# Patient Record
Sex: FEMALE | HISPANIC OR LATINO | Employment: FULL TIME | URBAN - METROPOLITAN AREA
[De-identification: names, ages, dates, MRNs, and addresses within clinical notes are randomized per-mention and may not be internally consistent; named-entity substitution may affect disease eponyms.]

---

## 2017-06-19 ENCOUNTER — OFFICE VISIT (OUTPATIENT)
Dept: INTERNAL MEDICINE | Facility: CLINIC | Age: 47
End: 2017-06-19
Payer: COMMERCIAL

## 2017-06-19 VITALS
HEART RATE: 90 BPM | OXYGEN SATURATION: 98 % | WEIGHT: 182.75 LBS | SYSTOLIC BLOOD PRESSURE: 130 MMHG | DIASTOLIC BLOOD PRESSURE: 82 MMHG | BODY MASS INDEX: 32.38 KG/M2 | RESPIRATION RATE: 16 BRPM | HEIGHT: 63 IN

## 2017-06-19 DIAGNOSIS — K21.9 GASTROESOPHAGEAL REFLUX DISEASE, ESOPHAGITIS PRESENCE NOT SPECIFIED: ICD-10-CM

## 2017-06-19 DIAGNOSIS — N92.0 MENORRHAGIA WITH REGULAR CYCLE: Primary | ICD-10-CM

## 2017-06-19 DIAGNOSIS — R19.7 DIARRHEA OF PRESUMED INFECTIOUS ORIGIN: ICD-10-CM

## 2017-06-19 PROCEDURE — 99203 OFFICE O/P NEW LOW 30 MIN: CPT | Mod: S$GLB,,, | Performed by: NURSE PRACTITIONER

## 2017-06-19 PROCEDURE — 99999 PR PBB SHADOW E&M-EST. PATIENT-LVL V: CPT | Mod: PBBFAC,,, | Performed by: NURSE PRACTITIONER

## 2017-06-19 RX ORDER — NAPROXEN 500 MG/1
500 TABLET ORAL 2 TIMES DAILY WITH MEALS
Qty: 60 TABLET | Refills: 0 | Status: SHIPPED | OUTPATIENT
Start: 2017-06-19 | End: 2017-09-22

## 2017-06-19 RX ORDER — PROMETHAZINE HYDROCHLORIDE 25 MG/1
TABLET ORAL
Refills: 0 | COMMUNITY
Start: 2017-05-10 | End: 2017-07-10 | Stop reason: SDUPTHER

## 2017-06-19 RX ORDER — IBUPROFEN 800 MG/1
TABLET ORAL
Refills: 1 | COMMUNITY
Start: 2017-06-01 | End: 2017-06-19

## 2017-06-19 RX ORDER — CIPROFLOXACIN 500 MG/1
500 TABLET ORAL EVERY 12 HOURS
Qty: 14 TABLET | Refills: 0 | Status: SHIPPED | OUTPATIENT
Start: 2017-06-19 | End: 2017-07-10

## 2017-06-19 RX ORDER — OMEPRAZOLE 40 MG/1
40 CAPSULE, DELAYED RELEASE ORAL DAILY
Qty: 30 CAPSULE | Refills: 0 | Status: SHIPPED | OUTPATIENT
Start: 2017-06-19 | End: 2018-01-05 | Stop reason: CLARIF

## 2017-06-19 NOTE — PATIENT INSTRUCTIONS
"  [place] Diet  Your healthcare provider may recommend a bland diet if you have an upset stomach. It consists of foods that are mild and easy to digest. It is better to eat small frequent meals rather than 3 large meals a day.    Beverages  OK: Fruit juices, non-caffeinated teas and coffee, non-carbonated [place]  Avoid: Carbonated beverage, caffeinated tea and coffee, all alcoholic beverages  Bread  OK: Refined white, wheat or rye bread, erika or soda crackers, Brisa toast, plain rolls, bagels  Avoid: Whole-grain bread  Cereal  OK: Refined cereals: cooked or ready to eat  Avoid: Whole-grain cereals and granola, or those containing bran, seeds or nuts  Desserts  OK: Peanut butter and all others except those to "avoid"  Avoid: Chocolate, cocoa, coconut, popcorn, nuts, seeds, jam, marmalade  Fruits  OK: Canned, cooked, frozen or fresh fruits without seeds or tough skin  Avoid: Olives, skin and seeds of fruit  Meats  OK: All fresh or preserved meat, fish and fowl  Avoid: Any that are prepared with those spices to "avoid"  Cheese and eggs  OK: Eggs, cottage cheese, cream cheese, other cheeses  Avoid: All cheeses made with those spices to "avoid"  Potatoes and pasta  OK: Potato, rice, macaroni, noodles, spaghetti  Avoid: None  Soups  OK: All soups without heavy seasoning  Avoid: Soups made with those spices to "avoid"  Vegetables  OK: Canned, cooked, fresh or frozen mildly flavored vegetables without seeds, skins or coarse fiber  Avoid: Vegetables prepared with those spices to "avoid"; skin and seeds of vegetables and those with coarse fiber  Spices  OK: Salt, lemon and lime juice, vinegar, all extracts, juan, cinnamon, thyme, mace, allspice, paprika  Avoid: Chili powder, cloves, pepper, seed spices, garlic, gravy pickles, highly seasoned salad dressings  Date Last Reviewed: 11/20/2015  © 4801-2170 [employer]. [address] [place] [address]. All rights reserved. This information is not intended as " a substitute for professional medical care. Always follow your healthcare professional's instructions.

## 2017-06-23 ENCOUNTER — CLINICAL SUPPORT (OUTPATIENT)
Dept: OTHER | Facility: CLINIC | Age: 47
End: 2017-06-23
Payer: COMMERCIAL

## 2017-06-23 VITALS
HEIGHT: 61 IN | WEIGHT: 184 LBS | SYSTOLIC BLOOD PRESSURE: 128 MMHG | BODY MASS INDEX: 34.74 KG/M2 | DIASTOLIC BLOOD PRESSURE: 86 MMHG

## 2017-06-23 DIAGNOSIS — Z00.8 HEALTH EXAMINATION IN POPULATION SURVEYS: Primary | ICD-10-CM

## 2017-06-23 LAB
GLUCOSE SERPL-MCNC: 118 MG/DL (ref 60–140)
POC CHOLESTEROL, HDL: 47 MG/DL (ref 40–?)
POC CHOLESTEROL, LDL: 81 MG/DL (ref ?–160)
POC CHOLESTEROL, TOTAL: 148 MG/DL (ref ?–240)
POC GLUCOSE FASTING: NORMAL MG/DL (ref 60–110)
POC TOTAL CHOLESTEROL / HDL RATIO: 3.1 (ref ?–6)
POC TRIGLYCERIDES: 100 MG/DL (ref ?–160)

## 2017-06-23 PROCEDURE — 82947 ASSAY GLUCOSE BLOOD QUANT: CPT | Mod: QW,S$GLB,, | Performed by: INTERNAL MEDICINE

## 2017-06-23 PROCEDURE — 80061 LIPID PANEL: CPT | Mod: QW,S$GLB,, | Performed by: INTERNAL MEDICINE

## 2017-06-23 PROCEDURE — 99401 PREV MED CNSL INDIV APPRX 15: CPT | Mod: S$GLB,,, | Performed by: INTERNAL MEDICINE

## 2017-07-10 ENCOUNTER — LAB VISIT (OUTPATIENT)
Dept: LAB | Facility: HOSPITAL | Age: 47
End: 2017-07-10
Payer: COMMERCIAL

## 2017-07-10 ENCOUNTER — OFFICE VISIT (OUTPATIENT)
Dept: INTERNAL MEDICINE | Facility: CLINIC | Age: 47
End: 2017-07-10
Payer: COMMERCIAL

## 2017-07-10 VITALS
OXYGEN SATURATION: 97 % | WEIGHT: 182.75 LBS | BODY MASS INDEX: 32.38 KG/M2 | HEIGHT: 63 IN | DIASTOLIC BLOOD PRESSURE: 80 MMHG | SYSTOLIC BLOOD PRESSURE: 126 MMHG | HEART RATE: 97 BPM

## 2017-07-10 DIAGNOSIS — Z11.3 SCREENING FOR STDS (SEXUALLY TRANSMITTED DISEASES): ICD-10-CM

## 2017-07-10 DIAGNOSIS — Z11.3 SCREENING FOR STDS (SEXUALLY TRANSMITTED DISEASES): Primary | ICD-10-CM

## 2017-07-10 DIAGNOSIS — M54.9 MECHANICAL BACK PAIN: ICD-10-CM

## 2017-07-10 DIAGNOSIS — D25.9 UTERINE LEIOMYOMA, UNSPECIFIED LOCATION: ICD-10-CM

## 2017-07-10 DIAGNOSIS — F41.9 ANXIETY: ICD-10-CM

## 2017-07-10 DIAGNOSIS — Z00.00 HEALTHCARE MAINTENANCE: ICD-10-CM

## 2017-07-10 PROCEDURE — 99999 PR PBB SHADOW E&M-EST. PATIENT-LVL V: CPT | Mod: PBBFAC,,,

## 2017-07-10 PROCEDURE — 36415 COLL VENOUS BLD VENIPUNCTURE: CPT

## 2017-07-10 PROCEDURE — 86703 HIV-1/HIV-2 1 RESULT ANTBDY: CPT

## 2017-07-10 PROCEDURE — 87591 N.GONORRHOEAE DNA AMP PROB: CPT

## 2017-07-10 PROCEDURE — 86592 SYPHILIS TEST NON-TREP QUAL: CPT

## 2017-07-10 RX ORDER — PROMETHAZINE HYDROCHLORIDE 25 MG/1
TABLET ORAL
Qty: 20 TABLET | Refills: 0 | Status: SHIPPED | OUTPATIENT
Start: 2017-07-10 | End: 2018-07-17
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
## 2017-07-10 NOTE — PATIENT INSTRUCTIONS
Referrals were sent to gynecology, psychology, and the [facility] program. I also placed an order for a screening mammogram. Please come back to clinic if there is any worsening of the conditions discussed today.

## 2017-07-11 LAB
C TRACH DNA SPEC QL NAA+PROBE: NOT DETECTED
HIV 1+2 AB+HIV1 P24 AG SERPL QL IA: NEGATIVE
N GONORRHOEA DNA SPEC QL NAA+PROBE: NOT DETECTED
RPR SER QL: NORMAL
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
## 2017-08-25 ENCOUNTER — OFFICE VISIT (OUTPATIENT)
Dept: UROGYNECOLOGY | Facility: CLINIC | Age: 47
End: 2017-08-25
Attending: OBSTETRICS & GYNECOLOGY
Payer: COMMERCIAL

## 2017-08-25 ENCOUNTER — TELEPHONE (OUTPATIENT)
Dept: INTERNAL MEDICINE | Facility: CLINIC | Age: 47
End: 2017-08-25

## 2017-08-25 ENCOUNTER — HOSPITAL ENCOUNTER (OUTPATIENT)
Dept: RADIOLOGY | Facility: HOSPITAL | Age: 47
Discharge: HOME | End: 2017-08-25
Payer: COMMERCIAL

## 2017-08-25 VITALS
WEIGHT: 185.63 LBS | BODY MASS INDEX: 32.89 KG/M2 | HEIGHT: 63 IN | DIASTOLIC BLOOD PRESSURE: 90 MMHG | SYSTOLIC BLOOD PRESSURE: 122 MMHG

## 2017-08-25 DIAGNOSIS — Z01.419 WELL WOMAN EXAM: Primary | ICD-10-CM

## 2017-08-25 DIAGNOSIS — N85.2 ENLARGED UTERUS: ICD-10-CM

## 2017-08-25 DIAGNOSIS — R15.0 INCOMPLETE DEFECATION: ICD-10-CM

## 2017-08-25 DIAGNOSIS — N39.3 STRESS INCONTINENCE, FEMALE: ICD-10-CM

## 2017-08-25 DIAGNOSIS — Z12.4 ENCOUNTER FOR SCREENING FOR CERVICAL CANCER: ICD-10-CM

## 2017-08-25 DIAGNOSIS — K59.00 CONSTIPATION, UNSPECIFIED CONSTIPATION TYPE: ICD-10-CM

## 2017-08-25 DIAGNOSIS — Z12.31 VISIT FOR SCREENING MAMMOGRAM: ICD-10-CM

## 2017-08-25 DIAGNOSIS — Z00.00 HEALTHCARE MAINTENANCE: ICD-10-CM

## 2017-08-25 DIAGNOSIS — N39.3 STRESS INCONTINENCE IN FEMALE: ICD-10-CM

## 2017-08-25 PROCEDURE — 77067 SCR MAMMO BI INCL CAD: CPT | Mod: 26,,, | Performed by: INTERNAL MEDICINE

## 2017-08-25 PROCEDURE — 88175 CYTOPATH C/V AUTO FLUID REDO: CPT

## 2017-08-25 PROCEDURE — 77063 BREAST TOMOSYNTHESIS BI: CPT | Mod: 26,,, | Performed by: INTERNAL MEDICINE

## 2017-08-25 PROCEDURE — 77067 SCR MAMMO BI INCL CAD: CPT | Mod: TC

## 2017-08-25 PROCEDURE — 87624 HPV HI-RISK TYP POOLED RSLT: CPT

## 2017-08-25 PROCEDURE — 99386 PREV VISIT NEW AGE 40-64: CPT | Mod: S$GLB,,, | Performed by: NURSE PRACTITIONER

## 2017-08-25 PROCEDURE — 99999 PR PBB SHADOW E&M-EST. PATIENT-LVL III: CPT | Mod: PBBFAC,,, | Performed by: NURSE PRACTITIONER

## 2017-08-25 NOTE — PROGRESS NOTES
"    SUBJECTIVE:   47 y.o. female for annual exam.    Past Medical History:   Diagnosis Date    GERD (gastroesophageal reflux disease)     Preeclampsia     Uterine fibroid        Past Surgical History:   Procedure Laterality Date    SINUS SURGERY      TUBAL LIGATION      "1 tube tied, 1 tube burned"       Current Outpatient Prescriptions   Medication Sig Dispense Refill    naproxen (EC NAPROSYN) 500 MG EC tablet Take 1 tablet (500 mg total) by mouth 2 (two) times daily with meals. 60 tablet 0    omeprazole (PRILOSEC) 40 MG capsule Take 1 capsule (40 mg total) by mouth once daily. 30 capsule 0    promethazine (PHENERGAN) 25 MG tablet TAKE 1 TABLET(S) EVERY 6 HOURS BY ORAL ROUTE AS NEEDED. 20 tablet 0     No current facility-administered medications for this visit.      Allergies: Review of patient's allergies indicates no known allergies.   Patient's last menstrual period was 2017 (exact date).         +kidney cancer-- sister    Denies family history of breast, colon, uterine, cervical, ovarian, and bladder cancer.    Well Woman:  Pap:2016--normal per patient report  Mammo:2017--pending  Colonoscopy:n/a  Dexa: years ago---reports normal    ROS:  Feeling well.   No dyspnea or chest pain on exertion.    No abdominal pain, change in bowel habits, black or bloody stools.      UI:  (+) GAMA   (--) UUI   (--) padsDaytime frequency: Q 3-4 hours.  Nocturia: No:  (--) dysuria,  (--) hematuria,  (--) frequent UTIs.  (+) complete bladder emptying.        POP:  Absent.  Symptoms:(--)  .  (--) vaginal bleeding. (--) vaginal discharge. (+) sexually active.  (--) dyspareunia.   (--)  Vaginal dryness.  (--) vaginal estrogen use.      BM:  (+) constipation/straining.  (--) chronic diarrhea. (--) hematochezia.  (--) fecal incontinence.  (--) fecal smearing/urgency.  (+) incomplete evacuation.  Complains of menorrhagia/ metrorrhagia    GYN ROS: normal menses, no abnormal bleeding, pelvic " "pain or discharge, no breast pain or new or enlarging lumps on self exam, she complains of menorrhagia/ metrorrhagia.   No neurological complaints.    OBJECTIVE:   The patient appears well, alert, oriented x 3, in no distress.  BP (!) 122/90 (BP Method: Large (Manual))   Ht 5' 3" (1.6 m)   Wt 84.2 kg (185 lb 10 oz)   LMP 08/17/2017 (Exact Date)   BMI 32.88 kg/m²   ENT normal.  Neck supple. No adenopathy or thyromegaly. VANESSA.   Lungs are clear, good air entry, no wheezes, rhonchi or rales.   S1 and S2 normal, no murmurs, regular rate and rhythm.   Abdomen soft without tenderness, guarding, mass or organomegaly.   Extremities show no edema, normal peripheral pulses.   Neurological is normal, no focal findings.    BREAST EXAM: breasts appear normal, no suspicious masses, no skin or nipple changes or axillary nodes    PELVIC EXAM:   VULVA: normal appearing vulva with no masses, tenderness or lesions,   VAGINA: normal appearing vagina with normal color and discharge, no lesions,   CERVIX: normal appearing cervix without discharge or lesions, friable after pap smear, UTERUS: enlarged to 16 week's size, ADNEXA: no masses, (difficult to assess due to uterus)  RECTAL: deferred    POP-Q:  Aa -1; Ba -1; C -5; Ap -1; Bp -1; D -8.  Genital hiatus 3, perineal body 2 total vaginal length 11.   Dr. Bridges present during exam    [facility] 2/5    (--)GAMA    ASSESSMENT:   1. Well woman exam     2. Enlarged uterus  US Pelvis Complete Non OB   3. Encounter for screening for cervical cancer   Liquid-based pap smear, screening    HPV DNA (High Risk)   4. Stress incontinence, female  Simple Urodynamics w/ Cysto   5. Constipation, unspecified constipation type     6. Incomplete defecation         PLAN:   1. Well woman  --pap today  --takes 2-3 weeks for results    2.enlarged uterus  --pelvic ultrasound  --discussed with Dr. Yuliana TAYLOR vs. Vaginal hysterctomy/ uterosacral suspension  --will need urodyanamics prior to scheduling surgery "     Obtain records from Dr. Medina for pap smear and pelvic ultrasound and DIS ([place]) pelvic ultrasound [phone]    3. Stress incontinence  --will schedule suds  --possible pelvic floor PT    4. Constipation/incomplete defecation:  Controlling constipation may help bladder urgency/leakage and fiber may better control cholesterol and blood glucose.  Start daily fiber.  Take 1 tsp of fiber powder (psyllium or other sugar-free powder).  Mix in 8 oz of water.  Take x 3-5 days.  Then, increase fiber by 1 tsp every 3-5 days until stool is easy to pass.  Stop and continue at that dose.   Do not exceed 6 tsps/day.  May also use over the counter stool softener 1-2 x/day.  AVOID laxatives.    Or     Take 1 fiber pill/day x 3 days.  Then 2 pills/day x 3 days.  Then 3 pills/day x 3 days...increasing in this fashion to max 6 pills a day.  STOP when you find dose that makes stool easy to pass (this may be 1 pill a day or may be 4 pills/day).  Continue at this dose FOREVER.  Additionally, take 1-2 stool softener pills (EX: colace) OTC daily.  AVOID laxatives.     5. RTC for suds      30 minutes were spent in face to face time with this patient  75 % of this time was spent in counseling and/or coordination of care  Vianney ROBLES Francisca  [facility]  Division of Female Pelvic Medicine and Reconstructive Surgery  Department of Obstetrics & Gynecology

## 2017-08-25 NOTE — TELEPHONE ENCOUNTER
----- Message from Yuliet Acevedo sent at 8/25/2017 12:49 PM CDT -----  Contact: Self/[phone]  Patient would like to get test results.  Name of test :  STD testing(Labs)  Date of test:  7/10/2017  Ordering provider: Dr.Logan Chi  Where was the test performed:  [facility]  Comments:  Please call and advise        Thanks!!!

## 2017-08-25 NOTE — PATIENT INSTRUCTIONS
1. Well woman  --pap today  --takes 2-3 weeks for results    2.enlarged uterus  --pelvic ultrasound  --discussed with Dr. Yuliana TAYLOR vs. Vaginal hysterctomy/ uterosacral suspension  --will need urodyanamics prior to scheduling surgery     Obtain records from Dr. Medina for pap smear and pelvic ultrasound and DIS ([place]) pelvic ultrasound [phone]    3. Stress incontinence  --will schedule suds  --possible pelvic floor PT    4. Constipation:  Controlling constipation may help bladder urgency/leakage and fiber may better control cholesterol and blood glucose.  Start daily fiber.  Take 1 tsp of fiber powder (psyllium or other sugar-free powder).  Mix in 8 oz of water.  Take x 3-5 days.  Then, increase fiber by 1 tsp every 3-5 days until stool is easy to pass.  Stop and continue at that dose.   Do not exceed 6 tsps/day.  May also use over the counter stool softener 1-2 x/day.  AVOID laxatives.    Or     Take 1 fiber pill/day x 3 days.  Then 2 pills/day x 3 days.  Then 3 pills/day x 3 days...increasing in this fashion to max 6 pills a day.  STOP when you find dose that makes stool easy to pass (this may be 1 pill a day or may be 4 pills/day).  Continue at this dose FOREVER.  Additionally, take 1-2 stool softener pills (EX: colace) OTC daily.  AVOID laxatives.     5. RTC for suds

## 2017-08-27 PROBLEM — N39.3 STRESS INCONTINENCE IN FEMALE: Status: ACTIVE | Noted: 2017-08-27

## 2017-08-27 PROBLEM — N85.2 ENLARGED UTERUS: Status: ACTIVE | Noted: 2017-08-27

## 2017-08-31 LAB
HPV HR 12 DNA CVX QL NAA+PROBE: NEGATIVE
HPV16 DNA SPEC QL NAA+PROBE: NEGATIVE
HPV18 DNA SPEC QL NAA+PROBE: NEGATIVE

## 2017-09-01 ENCOUNTER — HOSPITAL ENCOUNTER (OUTPATIENT)
Dept: RADIOLOGY | Facility: HOSPITAL | Age: 47
Discharge: HOME | End: 2017-09-01
Attending: NURSE PRACTITIONER
Payer: COMMERCIAL

## 2017-09-01 DIAGNOSIS — N85.2 ENLARGED UTERUS: ICD-10-CM

## 2017-09-01 PROCEDURE — 76856 US EXAM PELVIC COMPLETE: CPT | Mod: TC

## 2017-09-01 PROCEDURE — 76830 TRANSVAGINAL US NON-OB: CPT | Mod: 26,,, | Performed by: RADIOLOGY

## 2017-09-01 PROCEDURE — 76856 US EXAM PELVIC COMPLETE: CPT | Mod: 26,,, | Performed by: RADIOLOGY

## 2017-09-05 ENCOUNTER — TELEPHONE (OUTPATIENT)
Dept: UROGYNECOLOGY | Facility: CLINIC | Age: 47
End: 2017-09-05

## 2017-09-05 DIAGNOSIS — R93.89 THICKENED ENDOMETRIUM: Primary | ICD-10-CM

## 2017-09-05 NOTE — TELEPHONE ENCOUNTER
Reviewed ultrasound with patient.  Will perform embx when suds is done.  Orders placed.  She will call to reschedule.  ALEXANDREA Melgar-BC

## 2017-09-06 ENCOUNTER — TELEPHONE (OUTPATIENT)
Dept: UROGYNECOLOGY | Facility: CLINIC | Age: 47
End: 2017-09-06

## 2017-09-06 NOTE — TELEPHONE ENCOUNTER
----- Message from More Skelton sent at 9/6/2017  8:43 AM CDT -----  Contact: self  Patient is requesting a call back from staff in regards to her conversation with Vianney Espinosa yesterday. Patient can be reached at [phone].

## 2017-09-07 ENCOUNTER — TELEPHONE (OUTPATIENT)
Dept: UROGYNECOLOGY | Facility: CLINIC | Age: 47
End: 2017-09-07

## 2017-09-07 NOTE — TELEPHONE ENCOUNTER
----- Message from Halima Santos sent at 9/7/2017  9:54 AM CDT -----  _  1st Request  X_  2nd Request  _  3rd Request        Who: RYLAN BAE [[id number]]    Why: Pt is returning a call from Leoncio. Please call back. If pt does not answer please leave a message with details.    What Number to Call Back:[phone]    When to Expect a call back: (With in 24 hours)

## 2017-09-07 NOTE — TELEPHONE ENCOUNTER
----- Message from Moira Flores sent at 9/7/2017  9:01 AM CDT -----  Contact: self  x_  1st Request  _  2nd Request  _  3rd Request    Who:RYLAN BAE [[id number]]    Why: pt returning call.. Please advise  What Number to Call Back: [phone]    When to Expect a call back: (Before the end of the day)   -- if call after 3:00 call back will be tomorrow.

## 2017-09-07 NOTE — TELEPHONE ENCOUNTER
----- Message from Eden Sandoval sent at 9/7/2017 11:13 AM CDT -----  _x  1st Request  _  2nd Request  _  3rd Request        Who: jessica    Why: pt. Returning phone call to discuss results.please call to discuss call after 12:30    What Number to Call Back:[phone]    When to Expect a call back: (With in 24 hours)

## 2017-09-07 NOTE — TELEPHONE ENCOUNTER
Left voice message for pt to give the office a call back at [phone]. Called to relay normal pap.

## 2017-09-11 ENCOUNTER — TELEPHONE (OUTPATIENT)
Dept: UROGYNECOLOGY | Facility: CLINIC | Age: 47
End: 2017-09-11

## 2017-09-11 NOTE — TELEPHONE ENCOUNTER
Pt states she has cramps and naproxen isn't working. Relayed to pt per NP Francisca that she can take up to two at time twice a day. Pt voice understanding and call ended.

## 2017-09-11 NOTE — TELEPHONE ENCOUNTER
----- Message from Cata Huffman sent at 9/11/2017  8:53 AM CDT -----  Contact: RYLAN BAE [[id number]]  x_  1st Request  _  2nd Request  _  3rd Request        Who: RYLAN BAE [[id number]]    Why: Patient states she will be on her cycle on Thursday but it will be light and wanted to know if she can still have her procedure. Patient also would like to know what she can take for her cramps because Naproxen Sodium is not working.  Please advise    What Number to Call Back: [phone] patient asks for a call after 1:30 PM    When to Expect a call back: (Before the end of the day)   -- if call after 3:00 call back will be tomorrow.

## 2017-09-13 ENCOUNTER — TELEPHONE (OUTPATIENT)
Dept: UROGYNECOLOGY | Facility: CLINIC | Age: 47
End: 2017-09-13

## 2017-09-13 DIAGNOSIS — Z29.9 PROPHYLACTIC MEASURE: Primary | ICD-10-CM

## 2017-09-13 RX ORDER — MISOPROSTOL 200 UG/1
TABLET ORAL
Qty: 2 TABLET | Refills: 0 | Status: SHIPPED | OUTPATIENT
Start: 2017-09-13 | End: 2018-01-05 | Stop reason: CLARIF

## 2017-09-13 NOTE — TELEPHONE ENCOUNTER
----- Message from More Skelton sent at 9/13/2017  3:42 PM CDT -----  Contact: self  Patient is requesting a call back. Patient state she was told she needed to take a Rx the night before her procedure. Patient is unsure of what the Rx is called and states nothing had been sent yet. Patient can be reached at [phone].

## 2017-09-13 NOTE — TELEPHONE ENCOUNTER
Informed pt BEN Espinosa will send her Cytotec to her pharmacy. Pt vocied understanding and call ended.

## 2017-09-14 ENCOUNTER — PROCEDURE VISIT (OUTPATIENT)
Dept: UROGYNECOLOGY | Facility: CLINIC | Age: 47
End: 2017-09-14
Payer: COMMERCIAL

## 2017-09-14 VITALS
BODY MASS INDEX: 32.96 KG/M2 | SYSTOLIC BLOOD PRESSURE: 124 MMHG | WEIGHT: 186.06 LBS | DIASTOLIC BLOOD PRESSURE: 80 MMHG

## 2017-09-14 DIAGNOSIS — N94.6 MENSTRUAL CRAMPS: Primary | ICD-10-CM

## 2017-09-14 DIAGNOSIS — N39.3 STRESS INCONTINENCE, FEMALE: ICD-10-CM

## 2017-09-14 DIAGNOSIS — R93.89 THICKENED ENDOMETRIUM: ICD-10-CM

## 2017-09-14 LAB
BILIRUB SERPL-MCNC: NORMAL MG/DL
BLOOD URINE, POC: >250
COLOR, POC UA: NORMAL
GLUCOSE UR QL STRIP: NORMAL
KETONES UR QL STRIP: NORMAL
LEUKOCYTE ESTERASE URINE, POC: NORMAL
NITRITE, POC UA: NORMAL
PH, POC UA: 6
PROTEIN, POC: NORMAL
SPECIFIC GRAVITY, POC UA: 1.01
UROBILINOGEN, POC UA: NORMAL

## 2017-09-14 PROCEDURE — 51797 INTRAABDOMINAL PRESSURE TEST: CPT | Mod: S$GLB,,, | Performed by: OBSTETRICS & GYNECOLOGY

## 2017-09-14 PROCEDURE — 88305 TISSUE EXAM BY PATHOLOGIST: CPT | Mod: 26,,, | Performed by: PATHOLOGY

## 2017-09-14 PROCEDURE — 81002 URINALYSIS NONAUTO W/O SCOPE: CPT | Mod: S$GLB,,, | Performed by: OBSTETRICS & GYNECOLOGY

## 2017-09-14 PROCEDURE — 88305 TISSUE EXAM BY PATHOLOGIST: CPT | Performed by: PATHOLOGY

## 2017-09-14 PROCEDURE — 51728 CYSTOMETROGRAM W/VP: CPT | Mod: S$GLB,,, | Performed by: OBSTETRICS & GYNECOLOGY

## 2017-09-14 PROCEDURE — 51784 ANAL/URINARY MUSCLE STUDY: CPT | Mod: S$GLB,,, | Performed by: OBSTETRICS & GYNECOLOGY

## 2017-09-14 PROCEDURE — 52000 CYSTOURETHROSCOPY: CPT | Mod: S$GLB,,, | Performed by: OBSTETRICS & GYNECOLOGY

## 2017-09-14 PROCEDURE — 58100 BIOPSY OF UTERUS LINING: CPT | Mod: S$GLB,,, | Performed by: NURSE PRACTITIONER

## 2017-09-14 RX ORDER — NAPROXEN 500 MG/1
500 TABLET ORAL 2 TIMES DAILY
Qty: 30 TABLET | Refills: 4 | Status: SHIPPED | OUTPATIENT
Start: 2017-09-14 | End: 2017-09-22

## 2017-09-14 RX ORDER — CIPROFLOXACIN 500 MG/1
500 TABLET ORAL
Status: COMPLETED | OUTPATIENT
Start: 2017-09-14 | End: 2017-09-14

## 2017-09-14 RX ORDER — LIDOCAINE HYDROCHLORIDE 20 MG/ML
JELLY TOPICAL ONCE
Status: COMPLETED | OUTPATIENT
Start: 2017-09-14 | End: 2017-09-14

## 2017-09-14 RX ORDER — CIPROFLOXACIN 500 MG/1
500 TABLET ORAL EVERY 12 HOURS
Status: DISCONTINUED | OUTPATIENT
Start: 2017-09-14 | End: 2017-09-14

## 2017-09-14 RX ADMIN — CIPROFLOXACIN 500 MG: 500 TABLET ORAL at 04:09

## 2017-09-14 RX ADMIN — LIDOCAINE HYDROCHLORIDE 5 ML: 20 JELLY TOPICAL at 04:09

## 2017-09-14 NOTE — PROCEDURES
Endometrial Biopsy-Future  Date/Time: 9/14/2017 4:48 PM  Performed by: DENISE MYRICK  Authorized by: DENISE MYRICK   Preparation: Patient was prepped and draped in the usual sterile fashion.  Local anesthesia used: no    Anesthesia:  Local anesthesia used: no      PROCEDURE (EMBx):  The patient was placed in dorsal lithotomy position.  A sterile speculum was used to identify the cervix. The ectocervix was prepped x 2 with betadine.  A single tooth tenaculum was used to grasp the anterior cervix.  The endometrial biopsy pipelle was advanced into the uterine cavity until gentle resistance was met.  3 passes were made, and the specimen was submitted to pathology for further evaluation.  The tenaculum was removed, and the site remained hemostatic.  The speculum was removed.  The patient tolerated the procedure well.    ALEXANDREA Melgar-BC

## 2017-09-14 NOTE — PROCEDURES
TITLE OF OPERATION:  1. Complex cystometry.  2. Electromyography with surface electrodes.  3. Pressure voiding flow study.  4. Abdominal pressure measurement.  5. Leak point pressure measurement.    INDICATIONS:  Saw BEN Espinosa for annual well-woman visit on 8/27/17.      UI:  (+) GAMA   (--) UUI   (--) padsDaytime frequency: Q 3-4 hours.  Nocturia: No:  (--) dysuria,  (--) hematuria,  (--) frequent UTIs.  (+) complete bladder emptying.          POP:  Absent.  Symptoms:(--)  .  (--) vaginal bleeding. (--) vaginal discharge. (+) sexually active.  (--) dyspareunia.   (--)  Vaginal dryness.  (--) vaginal estrogen use.    --POP-Q:  Aa -1; Ba -1; C -5; Ap -1; Bp -1; D -8.  Genital hiatus 3, perineal body 2 total vaginal length 11.     BM:  (+) constipation/straining.  (--) chronic diarrhea. (--) hematochezia.  (--) fecal incontinence.  (--) fecal smearing/urgency.  (+) incomplete evacuation.  Complains of menorrhagia/ metrorrhagia     GYN ROS: normal menses, no abnormal bleeding, pelvic pain or discharge, no breast pain or new or enlarging lumps on self exam, she complains of menorrhagia/ metrorrhagia.   --pap 8/25/17: NSIL  --Pelvic US 9/1/17:  The uterus measures 10.3 x 5.5 x 6.0cm and demonstrates several masses that are suggestive of fibroids.  These range in size from 1.6 cm to 2.6 cm in greatest dimension.  One mass is associated with the endometrium and is indeterminate.  It could represent a submucosal fibroid however other endometrial pathology cannot be excluded.  The endometrial thickness is 17mm. There is no endometrial fluid. The uterus is otherwise unremarkable. The right ovary was not definitively seen. The left ovary measures 3.6 x 2.6 x 2.5 cm and demonstrates a 2.6 cm simple cyst.  The left ovary is otherwise unremarkable. There is intact vascular flow to the left ovary. There is no free fluid in the visualized pelvis.    PREOPERATIVE DIAGNOSIS:  1) Enlarged uterus  2) Female stress incontinence  3)  Chronic constipation  4) Incomplete defecation    POSTOPERATIVE DIAGNOSIS:  1) Enlarged uterus  2) Female stress incontinence  3) Chronic constipation  4) Incomplete defecation  5) Urodynamic stress incontinence  6) Decreased urethral coaptation, mild    ANESTHESIA:  None.    SPECIMEN (BACTERIOLOGICAL, PATHOLOGICAL OR OTHER):  None.    PROSTHETIC DEVICE/IMPLANT:  None.    SURGEONS NARRATIVE:  A time out was performed in which the patient identity and procedure were confirmed.  Urodynamic evaluation was performed using a computerized system ([employer]  Uroflowmetry was performed on the patient in the sitting position without catheters in place.  Subsequent urodynamic testing was performed with the patient in the lithotomy position at 45 degrees. Air charged catheters were used with sterile water as the infusion medium. Vesical and abdominal (rectal) pressures were measured, and detrusor pressure was calculated. EMG activity was recorded with surface electrodes. During filling, room temperature sterile water was infused at a rate of 30 cubic centimeters per minute. The patient was asked cough after instillation of each 100cc volume. Two Valsalva leak point pressures and two cough leak point pressures were performed with the catheters in place at 300 cubic centimeters and again at maximum capacity. Valsalva leak point pressure was defined as the difference between vesical pressure at which leakage was noted (visualized at the external urethral meatus) and the baseline vesical pressure. Following urodynamic testing, a pressure flow study was performed with the patient in the sitting position. Vesical and abdominal pressures were monitored and detrusor pressures were calculated. After the pressure flow study, the catheters were then removed. The patient tolerated the procedure well.     Urine dipstick: neg.    1.  VOIDING PHASE:      a.  Uroflowmetry:   Prolapse reduction: No   Voided volume:   Negative volume recorded    PVR:   10 mL    The overall configuration of this uroflow study was with error precluding interpretation.      b.  Pressure flow:   Prolapse reduction: No   Voided volume:   375 mL   Voiding time:   51 seconds   Peak flow:  16 mL/s    Avg flow:  8 mL/s   Max det pressure:  110  cm H20   Det pressure at max flow: 41 cm H20   Void initiated by detrusor contraction, aided by valsalva.     Urethral relaxation (EMG):  absent.     PVR (calculated):  0 mL    The overall configuration of this pressure flow study was normal with some valsalva assist.      2.  FILLING PHASE:   1st desire: 104 mL   Normal desire:  153 mL   Strong desire:  221 mL   Urgency:  356 mL   Compliance (calculated)  40+ mL/cm H20   EMG activity during filling:   stable   Detrusor contractions observed: No.     3.  URETHRAL FUNCTION/STORAGE PHASE:    a.  WITHOUT prolapse reduction:   CLPP (155 mL): Negative  at  132 cm H20   VLPP (155 mL): Negative  at  81 cm H20    CLPP (300 mL): Negative  at  147 cm H20   VLPP (300 mL): Negative  at  58 cm H20    CLPP (MAX ):    Positive  at  166 cm H20   VLPP (MAX):     Positive  at  81 cm H20    These findings are consistent with Positive urodynamic stress incontinence.    EXAM:  POPQ: C -3 to -4, D -8.  Minimal cervical/uterine descensus on standing/valsalva, narrow pelvis.     Assessment:  UF not performed.  PF normal with some valsalva assist.  Compliance normal.  Max capacity 375 mL.  DO (--).  GAMA (+).      Plan:     1.enlarged uterus  --pelvic ultrasound with thickened EMB; EMBx today benign  --bony pelvis feels small with minimal cervical descensus and fibroid uterus: TLH/ uterosacral suspension  --add MUS for GAMA     2. Stress incontinence  --add MUS for GAMA     3. Constipation/incomplete defecation:  --continue to control  --daily fiber    4.  Will call patient to discuss options.  Consider TLH/USS/MUS if desired.       ------------------------------------------------------------------------    Title of Operation:   Cystourethroscopy.     INDICATIONS:  As above    PREOPERATIVE DIAGNOSIS  As above    POSTOPERATIVE DIAGNOSIS:   As above    Anesthesia:   2% Xylocaine gel.    Specimen (Bacteriological, Pathological or other):   None.     Prosthetic Device/Implant:   None.     Surgeons Narrative:     After informed consent was obtained, the patient was placed in the lithotomy position. The urethral meatus was prepped with Betadine and 10 cubic centimeters of 2% Xylocaine gel were introduced into the urethra. A flexible cystourethroscope was introduced into the bladder. The bladder was distended with approximately 300 cubic centimeters of sterile water. A systematic survey was performed in which the bladder was surveyed using multiple sequential passes in a clockwise fashion from the bladder dome to the bladder base to the urethrovesical junction. The trigone and ureteral orifices were observed. The scope was then flipped back on itself, and the urethrovesical junction was viewed. A vaginal examining finger was then placed with pressure suburethrally at the urethrovesical junction as the telescope was withdrawn in order to perform positive pressure urethroscopy.  Standard maneuvers of cough, squeeze and Valsalva were performed. The telescope was then completely withdrawn.     Findings: Urethroscopy:  Normal with mild decreased in coaptation.  Cystoscopy:  Normal bladder mucosa, bilateral ureteral flow was noted.     Assessment: Essentially normal cystourethroscopy.      Plan: The patient will follow up with Dr. Bridges as scheduled.  See urodynamics note from 9/14/17 for further plan details.

## 2017-09-21 ENCOUNTER — TELEPHONE (OUTPATIENT)
Dept: UROGYNECOLOGY | Facility: CLINIC | Age: 47
End: 2017-09-21

## 2017-09-21 DIAGNOSIS — R30.0 DYSURIA: Primary | ICD-10-CM

## 2017-09-21 NOTE — TELEPHONE ENCOUNTER
Pt states she's having burning after she urinates since her SUDS/CYSTO.  Pt states she was given an antibiotic the day of her procedure however it's still not helping.  I informed the pt I'd relay this message to BEN Espinosa and one of us would get back to her. Pt voiced understanding and call ended.

## 2017-09-21 NOTE — TELEPHONE ENCOUNTER
Spoke to pt and informed her to drop off a urine specimen and BEN Espinosa will send in a rx. Pt states she'll drop it off tomorrow afternoon.

## 2017-09-21 NOTE — TELEPHONE ENCOUNTER
----- Message from Cata Huffman sent at 9/21/2017  8:44 AM CDT -----  Contact: RYLAN BAE [[id number]]  x_  1st Request  _  2nd Request  _  3rd Request        Who: RYLAN BAE [[id number]]    Why: Patient states she feels like she has a UTI and wanted to know if she should still have these symptoms after a week from the procedure she had on 9/15. Patient would like to know if she should make an appt. Please advise.     What Number to Call Back: [phone]    When to Expect a call back: (Before the end of the day)   -- if call after 3:00 call back will be tomorrow.

## 2017-09-22 ENCOUNTER — OFFICE VISIT (OUTPATIENT)
Dept: INTERNAL MEDICINE | Facility: CLINIC | Age: 47
End: 2017-09-22
Payer: COMMERCIAL

## 2017-09-22 ENCOUNTER — IMMUNIZATION (OUTPATIENT)
Dept: INTERNAL MEDICINE | Facility: CLINIC | Age: 47
End: 2017-09-22
Payer: COMMERCIAL

## 2017-09-22 ENCOUNTER — HOSPITAL ENCOUNTER (OUTPATIENT)
Dept: RADIOLOGY | Facility: HOSPITAL | Age: 47
Discharge: HOME | End: 2017-09-22
Attending: NURSE PRACTITIONER
Payer: COMMERCIAL

## 2017-09-22 VITALS
WEIGHT: 185.88 LBS | DIASTOLIC BLOOD PRESSURE: 88 MMHG | SYSTOLIC BLOOD PRESSURE: 130 MMHG | BODY MASS INDEX: 32.93 KG/M2 | HEIGHT: 63 IN | HEART RATE: 91 BPM | OXYGEN SATURATION: 99 %

## 2017-09-22 DIAGNOSIS — M54.50 BILATERAL LOW BACK PAIN WITHOUT SCIATICA, UNSPECIFIED CHRONICITY: ICD-10-CM

## 2017-09-22 DIAGNOSIS — F34.1 DYSTHYMIA: ICD-10-CM

## 2017-09-22 DIAGNOSIS — R06.83 SNORING: ICD-10-CM

## 2017-09-22 DIAGNOSIS — M25.511 ACUTE PAIN OF RIGHT SHOULDER: ICD-10-CM

## 2017-09-22 DIAGNOSIS — M79.10 MYALGIA: ICD-10-CM

## 2017-09-22 DIAGNOSIS — R40.0 HAS DAYTIME DROWSINESS: ICD-10-CM

## 2017-09-22 DIAGNOSIS — R53.83 FATIGUE, UNSPECIFIED TYPE: ICD-10-CM

## 2017-09-22 DIAGNOSIS — R51.9 GENERALIZED HEADACHES: Primary | ICD-10-CM

## 2017-09-22 PROCEDURE — 3008F BODY MASS INDEX DOCD: CPT | Mod: S$GLB,,, | Performed by: NURSE PRACTITIONER

## 2017-09-22 PROCEDURE — 72110 X-RAY EXAM L-2 SPINE 4/>VWS: CPT | Mod: TC

## 2017-09-22 PROCEDURE — 72110 X-RAY EXAM L-2 SPINE 4/>VWS: CPT | Mod: 26,,, | Performed by: RADIOLOGY

## 2017-09-22 PROCEDURE — 73030 X-RAY EXAM OF SHOULDER: CPT | Mod: 26,RT,, | Performed by: RADIOLOGY

## 2017-09-22 PROCEDURE — 99999 PR PBB SHADOW E&M-EST. PATIENT-LVL IV: CPT | Mod: PBBFAC,,, | Performed by: NURSE PRACTITIONER

## 2017-09-22 PROCEDURE — 90471 IMMUNIZATION ADMIN: CPT | Mod: S$GLB,,, | Performed by: NURSE PRACTITIONER

## 2017-09-22 PROCEDURE — 90686 IIV4 VACC NO PRSV 0.5 ML IM: CPT | Mod: S$GLB,,, | Performed by: NURSE PRACTITIONER

## 2017-09-22 PROCEDURE — 99214 OFFICE O/P EST MOD 30 MIN: CPT | Mod: S$GLB,,, | Performed by: NURSE PRACTITIONER

## 2017-09-22 PROCEDURE — 73030 X-RAY EXAM OF SHOULDER: CPT | Mod: TC,RT

## 2017-09-22 RX ORDER — DICLOFENAC SODIUM 75 MG/1
75 TABLET, DELAYED RELEASE ORAL 2 TIMES DAILY
Qty: 30 TABLET | Refills: 0 | Status: SHIPPED | OUTPATIENT
Start: 2017-09-22 | End: 2018-05-07 | Stop reason: SDUPTHER

## 2017-09-22 RX ORDER — METHOCARBAMOL 500 MG/1
500 TABLET, FILM COATED ORAL 4 TIMES DAILY
Qty: 40 TABLET | Refills: 0 | Status: SHIPPED | OUTPATIENT
Start: 2017-09-22 | End: 2018-06-26 | Stop reason: SDUPTHER

## 2017-09-22 RX ORDER — BUTALBITAL, ACETAMINOPHEN AND CAFFEINE 50; 325; 40 MG/1; MG/1; MG/1
1 TABLET ORAL EVERY 6 HOURS PRN
Qty: 30 TABLET | Refills: 0 | Status: SHIPPED | OUTPATIENT
Start: 2017-09-22 | End: 2017-12-04 | Stop reason: SDUPTHER

## 2017-09-22 NOTE — PROGRESS NOTES
"INTERNAL MEDICINE PROGRESS NOTE    CHIEF COMPLAINT     Chief Complaint   Patient presents with    [facility]    Headache     x3 weeks     Back Pain     entire back     Sore Throat       HPI     Ingrid Gresham is a 47 y.o. female with gerd and uterine fibroids who presents for a follow up visit today.  Previous pt of Dr. Brown.     Pt with c/o headaches x3 weeks, back pain and sore throat.     Headaches- temporal and occipital regionIntermittent. Occur daily.   Described as hot and pressure.   No photophobia, phonophobia, no worse with mvmt.   Relieved with ibuprofen temporarily.   Treated ibuprofen, naproxen.   Associated with dizziness and nausea.   Occurs in the morning when at work, but really lasts all day.   Today points to the right frontal region.     Fatigue- sleeps lightly but sleeping approx 7-9 hours. Feels tired throughout the day. +snoring. Wakes up feeling tired.     Back pain- lower back pain and mid back pain acute on chronic. Started 1 year ago but more frequent x2 months. Denies injury and trauma. No change in activity. 2 separate pains- lower back and mid back.   Midback- worse when laying flat.   Treating with ibuprofen.   Denies numbness, tingling and radiation.     Right shoulder and upper arm pain- pain to the glenohumeral joint and trap area when abduction and internal rotating. +numbness and tingling down the arm. Worse when laying on the right arm +++numbness.     Reports feeling increased stress and feeling "grumpy". Trying to get appointment with psych.   -insomnia  -appetite changes.   - sadness or hopelessness  +anhedonia  +fatigue  +decreased concentration   +feeling like letting someone down.   +increased stress.     Hair falling out.     Past Medical History:  Past Medical History:   Diagnosis Date    GERD (gastroesophageal reflux disease)     Preeclampsia 1991    Uterine fibroid 2016       Home Medications:  Prior to Admission medications    Medication Sig Start " Date End Date Taking? Authorizing Provider   naproxen (EC NAPROSYN) 500 MG EC tablet Take 1 tablet (500 mg total) by mouth 2 (two) times daily. 9/14/17  Yes Vianney Espinosa NP   misoprostol (CYTOTEC) 200 MCG Tab Take one the night before and day of procedure 9/13/17   Vianney Espinosa NP   naproxen (EC NAPROSYN) 500 MG EC tablet Take 1 tablet (500 mg total) by mouth 2 (two) times daily with meals. 6/19/17   Radha Stapleton NP   omeprazole (PRILOSEC) 40 MG capsule Take 1 capsule (40 mg total) by mouth once daily. 6/19/17 6/19/18  Radha Stapleton NP   promethazine (PHENERGAN) 25 MG tablet TAKE 1 TABLET(S) EVERY 6 HOURS BY ORAL ROUTE AS NEEDED. 7/10/17   Dayton Brown MD       Review of Systems:  Review of Systems   Constitutional: Positive for fatigue. Negative for activity change, appetite change, chills, fever and unexpected weight change.   HENT: Positive for sore throat. Negative for congestion, dental problem, facial swelling, hearing loss, mouth sores, postnasal drip, rhinorrhea, sinus pain, sinus pressure, sneezing, tinnitus and voice change.    Eyes: Negative for visual disturbance.   Respiratory: Negative for cough, shortness of breath and wheezing.    Cardiovascular: Negative for chest pain.   Gastrointestinal: Positive for abdominal pain and nausea. Negative for constipation, diarrhea and vomiting.   Genitourinary: Positive for dysuria and pelvic pain. Negative for difficulty urinating, flank pain and urgency.   Musculoskeletal: Positive for back pain.   Skin: Negative for rash.   Neurological: Positive for headaches. Negative for dizziness and light-headedness.       Health Maintainence:   Immunizations:  Health Maintenance       Date Due Completion Date    Lipid Panel 1971 ---    Influenza Vaccine 08/01/2017 ---    Mammogram 08/25/2019 8/25/2017    Pap Smear with HPV Cotest 08/25/2020 8/25/2017    TETANUS VACCINE 07/10/2027 7/10/2017           PHYSICAL EXAM     /88 (BP Location:  "Right arm, Patient Position: Sitting, BP Method: Large (Manual))   Pulse 91   Ht 5' 3" (1.6 m)   Wt 84.3 kg (185 lb 13.6 oz)   LMP 08/03/2017 (Exact Date)   SpO2 99%   BMI 32.92 kg/m²     Physical Exam   Constitutional: She is oriented to person, place, and time. She appears well-developed and well-nourished.   HENT:   Head: Normocephalic.   Right Ear: External ear normal.   Left Ear: External ear normal.   Nose: Nose normal.   Mouth/Throat: Oropharynx is clear and moist. No oropharyngeal exudate.   Eyes: Pupils are equal, round, and reactive to light.   Neck: Neck supple. No JVD present. No tracheal deviation present. No thyromegaly present.   Cardiovascular: Normal rate, regular rhythm, normal heart sounds and intact distal pulses.  Exam reveals no gallop and no friction rub.    No murmur heard.  Pulmonary/Chest: Effort normal and breath sounds normal. No respiratory distress. She has no wheezes. She has no rales.   Abdominal: Soft. Bowel sounds are normal. She exhibits no distension and no mass. There is tenderness (LLQ and pelvis ). There is no rebound and no guarding.   Musculoskeletal: Normal range of motion. She exhibits no edema.        Right shoulder: She exhibits tenderness and pain. She exhibits normal range of motion, no bony tenderness, no swelling, no effusion, no crepitus, no spasm, normal pulse and normal strength.        Thoracic back: She exhibits tenderness, bony tenderness and pain. She exhibits normal range of motion, no swelling, no edema, no deformity, no laceration, no spasm and normal pulse.        Lumbar back: She exhibits tenderness, bony tenderness, pain and spasm. She exhibits normal range of motion, no swelling, no edema, no deformity, no laceration and normal pulse.        Back:         Arms:  Lymphadenopathy:     She has no cervical adenopathy.   Neurological: She is alert and oriented to person, place, and time. No cranial nerve deficit.   Skin: Skin is warm and dry. No rash " noted.   Psychiatric: She has a normal mood and affect. Her behavior is normal.   Vitals reviewed.      LABS     No results found for: LABA1C, HGBA1C  CMP  No results found for: NA, K, CL, CO2, GLU, BUN, CREATININE, CALCIUM, PROT, ALBUMIN, BILITOT, ALKPHOS, AST, ALT, ANIONGAP, ESTGFRAFRICA, EGFRNONAA  No results found for: WBC, HGB, HCT, MCV, PLT  Lab Results   Component Value Date    CHOL 148 06/23/2017     No results found for: HDL  No results found for: LDLCALC  No results found for: TRIG  No results found for: CHOLHDL  No results found for: TSH, V7GXPQH, K6OBOHH, THYROIDAB    ASSESSMENT/PLAN     Ingrid Gresham is a 47 y.o. female with  Past Medical History:   Diagnosis Date    GERD (gastroesophageal reflux disease)     Preeclampsia 1991    Uterine fibroid 2016     Generalized headaches- advised to keep headache journal. Will start fioricet as they have some migraine components. Will refer to neurology is not controlled.    -     butalbital-acetaminophen-caffeine -40 mg (FIORICET, ESGIC) -40 mg  per tablet; Take 1 tablet by mouth every 6 (six) hours as needed for Pain.  Dispense: 30  tablet; Refill: 0    Fatigue, unspecified type- labs today.  -     Polysomnogram (CPAP will be added if patient meets diagnostic criteria.); Future  -     CBC auto differential; Future; Expected date: 09/22/2017  -     Comprehensive metabolic panel; Future; Expected date: 09/22/2017  -     TSH; Future; Expected date: 09/22/2017  -     T4, free; Future; Expected date: 09/22/2017  -     Vitamin D; Future; Expected date: 09/22/2017    Snoring- will send for Sleep study.   -     Polysomnogram (CPAP will be added if patient meets diagnostic criteria.); Future    Has daytime drowsiness  -     Polysomnogram (CPAP will be added if patient meets diagnostic criteria.); Future    Acute pain of right shoulder- images today will refer to PT for eval, likely rotator cuff tendonopathy.   -     X-ray Shoulder 2 or More Views  Right; Future; Expected date: 09/22/2017  -     Vitamin D; Future; Expected date: 09/22/2017  -     diclofenac (VOLTAREN) 75 MG EC tablet; Take 1 tablet (75 mg total) by mouth 2 (two) times daily.  Dispense: 30 tablet; Refill: 0  -     methocarbamol (ROBAXIN) 500 MG Tab; Take 1 tablet (500 mg total) by mouth 4 (four) times daily.  Dispense: 40 tablet; Refill: 0    Bilateral low back pain without sciatica, unspecified chronicity  -     X-Ray Lumbar Spine Complete 5 View; Future; Expected date: 09/22/2017  -     Vitamin D; Future; Expected date: 09/22/2017  -     diclofenac (VOLTAREN) 75 MG EC tablet; Take 1 tablet (75 mg total) by mouth 2 (two) times daily.  Dispense: 30 tablet; Refill: 0  -     methocarbamol (ROBAXIN) 500 MG Tab; Take 1 tablet (500 mg total) by mouth 4 (four) times daily.  Dispense: 40 tablet; Refill: 0    Dysthymia- labs today. F/u with psychiatry   -     TSH; Future; Expected date: 09/22/2017  -     T4, free; Future; Expected date: 09/22/2017    Myalgia  -     Vitamin D; Future; Expected date: 09/22/2017                    Follow up as needed     Patient education provided from [place]. Patient was counseled on when and how to seek emergent care.       Dari BLAND, APRN, FNP-c   Department of Internal Medicine - [facility]  3:00 PM

## 2017-09-25 ENCOUNTER — TELEPHONE (OUTPATIENT)
Dept: UROGYNECOLOGY | Facility: CLINIC | Age: 47
End: 2017-09-25

## 2017-09-25 NOTE — TELEPHONE ENCOUNTER
----- Message from Vianney Espinosa NP sent at 9/25/2017 10:15 AM CDT -----  Please let patient know her urine culture was negative.  ALEXANDREA Melgar-BC

## 2017-09-26 ENCOUNTER — TELEPHONE (OUTPATIENT)
Dept: INTERNAL MEDICINE | Facility: CLINIC | Age: 47
End: 2017-09-26

## 2017-09-26 ENCOUNTER — PATIENT MESSAGE (OUTPATIENT)
Dept: INTERNAL MEDICINE | Facility: CLINIC | Age: 47
End: 2017-09-26

## 2017-09-26 DIAGNOSIS — E55.9 VITAMIN D INSUFFICIENCY: ICD-10-CM

## 2017-09-26 DIAGNOSIS — M25.511 ACUTE PAIN OF RIGHT SHOULDER: Primary | ICD-10-CM

## 2017-09-26 NOTE — TELEPHONE ENCOUNTER
----- Message from Melvin Bird sent at 9/26/2017 12:28 PM CDT -----  Contact: self/ [phone] cell  Type: Test Results    What test was performed? Blood test/ Xray    Who ordered the test? Dr. Dari Mendoza    When and where were the test performed? 09/22/2017/ [facility]    Comments: Pt would like to speak with someone in the office to go over the results of the test performed last Friday.  Please call and advise.    Thank you

## 2017-09-26 NOTE — TELEPHONE ENCOUNTER
Discussed lab results with pt, Vit D def will start Vit D3 1000 units.     Xrays with mild DJD will refer to orthopedics.

## 2017-10-11 ENCOUNTER — TELEPHONE (OUTPATIENT)
Dept: UROGYNECOLOGY | Facility: CLINIC | Age: 47
End: 2017-10-11

## 2017-10-11 NOTE — TELEPHONE ENCOUNTER
Reviewed normal endometrial biopsy results with patient.  She needs to wait until 01/2018 for surgery.  ALEXANDREA Melgar-BC

## 2017-12-04 ENCOUNTER — OFFICE VISIT (OUTPATIENT)
Dept: PSYCHIATRY | Facility: CLINIC | Age: 47
End: 2017-12-04
Payer: COMMERCIAL

## 2017-12-04 VITALS
HEART RATE: 97 BPM | SYSTOLIC BLOOD PRESSURE: 161 MMHG | DIASTOLIC BLOOD PRESSURE: 89 MMHG | HEIGHT: 63 IN | BODY MASS INDEX: 33.66 KG/M2 | WEIGHT: 190 LBS

## 2017-12-04 DIAGNOSIS — F60.3 BORDERLINE PERSONALITY DISORDER: Primary | ICD-10-CM

## 2017-12-04 PROCEDURE — 99999 PR PBB SHADOW E&M-EST. PATIENT-LVL II: CPT | Mod: PBBFAC,,, | Performed by: PSYCHIATRY & NEUROLOGY

## 2017-12-04 PROCEDURE — 99203 OFFICE O/P NEW LOW 30 MIN: CPT | Mod: S$GLB,,, | Performed by: PSYCHIATRY & NEUROLOGY

## 2017-12-04 RX ORDER — LAMOTRIGINE 100 MG/1
100 TABLET ORAL DAILY
Qty: 30 TABLET | Refills: 3 | Status: SHIPPED | OUTPATIENT
Start: 2017-12-04 | End: 2018-01-05 | Stop reason: CLARIF

## 2017-12-04 RX ORDER — LAMOTRIGINE 25 MG/1
TABLET ORAL
Qty: 42 TABLET | Refills: 0 | Status: SHIPPED | OUTPATIENT
Start: 2017-12-04 | End: 2018-01-05 | Stop reason: CLARIF
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
## 2017-12-05 RX ORDER — BUTALBITAL, ACETAMINOPHEN AND CAFFEINE 50; 325; 40 MG/1; MG/1; MG/1
1 TABLET ORAL EVERY 6 HOURS PRN
Qty: 30 TABLET | Refills: 0 | Status: SHIPPED | OUTPATIENT
Start: 2017-12-05 | End: 2018-01-04

## 2017-12-12 ENCOUNTER — TELEPHONE (OUTPATIENT)
Dept: UROGYNECOLOGY | Facility: CLINIC | Age: 47
End: 2017-12-12

## 2017-12-12 NOTE — TELEPHONE ENCOUNTER
----- Message from Leona Grady sent at 12/12/2017  9:01 AM CST -----  Contact: pt  x_ 1st Request  _ 2nd Request  _ 3rd Request    Who: pt    Why: in regards to scheduling a surgery.     What Number to Call Back: [phone]    When to Expect a call back: (Before the end of the day)  -- if call after 3:00 call back will be tomorrow.

## 2017-12-12 NOTE — TELEPHONE ENCOUNTER
Informed pt she needed to get her FMLA paperwork from her employer in order for it to be filled out through the disability department. Pt voiced understanding and call ended.

## 2017-12-12 NOTE — TELEPHONE ENCOUNTER
----- Message from More Skelton sent at 12/12/2017  9:58 AM CST -----  Contact: self  Patient is returning a missed call regarding scheduling a surgery. Patient can be reached at [phone].

## 2017-12-12 NOTE — TELEPHONE ENCOUNTER
Returned pt call no answer, Left voice message for pt to give the office a call back at [phone].

## 2017-12-15 ENCOUNTER — TELEPHONE (OUTPATIENT)
Dept: UROGYNECOLOGY | Facility: CLINIC | Age: 47
End: 2017-12-15

## 2017-12-15 NOTE — TELEPHONE ENCOUNTER
Called pt who wanted to check the status of her [facility] paperwork, explain to pt that we do not handle [facility] paperwork. Gave pt the phone number [phone] and fax number [phone] to the [facility] department so that she could re fax her paperwork and monitor the status. Pt voiced understanding and call was ended.

## 2017-12-15 NOTE — TELEPHONE ENCOUNTER
----- Message from Chavez Colon sent at 12/15/2017 10:11 AM CST -----  Contact: Pt  X_ 1st Request  _ 2nd Request  _ 3rd Request    Who: RYLAN BAE [[id number]]    Why: Patient would like to confirm that her [facility] paperwork was received    What Number to Call Back: [phone]    When to Expect a call back: (Before the end of the day)  -- if call after 3:00 call back will be tomorrow.

## 2017-12-18 ENCOUNTER — TELEPHONE (OUTPATIENT)
Dept: UROGYNECOLOGY | Facility: CLINIC | Age: 47
End: 2017-12-18

## 2017-12-18 DIAGNOSIS — Z01.818 PREOP EXAMINATION: Primary | ICD-10-CM

## 2017-12-18 NOTE — TELEPHONE ENCOUNTER
Pt states she's returning BEN Espinosa's call from this morning.  I informed the pt I'd relay this message to BEN Espinosa and one of us would get back to her. Pt voiced understanding and call ended.

## 2017-12-18 NOTE — PROGRESS NOTES
I have independently evaluated the patient and have reviewed this note and agree with its contents, including the assessment and plan.     Mahnaz Sumner MD

## 2017-12-18 NOTE — TELEPHONE ENCOUNTER
Patient is deciding between 1/8 and 1/22 for surgical dates.  She will call the office once she decides.  ALEXANDREA Melgar-BC

## 2017-12-18 NOTE — TELEPHONE ENCOUNTER
----- Message from Leona Grady sent at 12/18/2017  1:38 PM CST -----  Contact: pt  x_ 1st Request  _ 2nd Request  _ 3rd Request    Who: pt    Why: is returning a call    What Number to Call Back: [phone]    When to Expect a call back: (Before the end of the day)  -- if call after 3:00 call back will be tomorrow.

## 2017-12-18 NOTE — TELEPHONE ENCOUNTER
----- Message from Ishmael Nelson sent at 12/18/2017  9:30 AM CST -----  PT RETURNING YOUR CALL [phone]

## 2017-12-20 ENCOUNTER — TELEPHONE (OUTPATIENT)
Dept: UROGYNECOLOGY | Facility: CLINIC | Age: 47
End: 2017-12-20

## 2017-12-20 NOTE — TELEPHONE ENCOUNTER
----- Message from Cata Huffman sent at 12/20/2017 12:06 PM CST -----  Contact: RYLAN BAE [[id number]]  _  1st Request  _  2nd Request  _  3rd Request        Who: RYLAN BAE [[id number]]    Why: patient states she would like a call back in regards to her disability paperwork for her upcoming surgery      What Number to Call Back: [phone]    When to Expect a call back: (Before the end of the day)   -- if call after 3:00 call back will be tomorrow.

## 2017-12-20 NOTE — TELEPHONE ENCOUNTER
Spoke to pt and informed her, the dates were wrong on the paperwork sent to Dr. Plasencia to sign and when the new forms are sent she will sign and send them back. Pt voiced understanding and call ended.

## 2017-12-21 ENCOUNTER — HOSPITAL ENCOUNTER (OUTPATIENT)
Dept: RADIOLOGY | Facility: HOSPITAL | Age: 47
Discharge: HOME | End: 2017-12-21
Attending: NURSE PRACTITIONER
Payer: COMMERCIAL

## 2017-12-21 ENCOUNTER — OFFICE VISIT (OUTPATIENT)
Dept: INTERNAL MEDICINE | Facility: CLINIC | Age: 47
End: 2017-12-21
Payer: COMMERCIAL

## 2017-12-21 VITALS
DIASTOLIC BLOOD PRESSURE: 78 MMHG | WEIGHT: 190.06 LBS | HEART RATE: 95 BPM | HEIGHT: 63 IN | BODY MASS INDEX: 33.68 KG/M2 | SYSTOLIC BLOOD PRESSURE: 124 MMHG | OXYGEN SATURATION: 96 %

## 2017-12-21 DIAGNOSIS — Z01.818 PREOPERATIVE CLEARANCE: Primary | ICD-10-CM

## 2017-12-21 DIAGNOSIS — Z01.818 PREOPERATIVE CLEARANCE: ICD-10-CM

## 2017-12-21 DIAGNOSIS — R05.9 COUGH: ICD-10-CM

## 2017-12-21 DIAGNOSIS — J30.2 SEASONAL ALLERGIC RHINITIS, UNSPECIFIED CHRONICITY, UNSPECIFIED TRIGGER: ICD-10-CM

## 2017-12-21 PROCEDURE — 99999 PR PBB SHADOW E&M-EST. PATIENT-LVL IV: CPT | Mod: PBBFAC,,, | Performed by: NURSE PRACTITIONER

## 2017-12-21 PROCEDURE — 71020 XR CHEST PA AND LATERAL: CPT | Mod: 26,,, | Performed by: RADIOLOGY

## 2017-12-21 PROCEDURE — 71020 XR CHEST PA AND LATERAL: CPT | Mod: TC

## 2017-12-21 PROCEDURE — 99213 OFFICE O/P EST LOW 20 MIN: CPT | Mod: S$GLB,,, | Performed by: NURSE PRACTITIONER

## 2017-12-21 RX ORDER — METHOCARBAMOL 500 MG/1
500 TABLET, FILM COATED ORAL 4 TIMES DAILY
COMMUNITY
End: 2018-01-05 | Stop reason: CLARIF

## 2017-12-21 RX ORDER — FLUTICASONE PROPIONATE 50 MCG
1 SPRAY, SUSPENSION (ML) NASAL DAILY
Qty: 1 BOTTLE | Refills: 3 | Status: SHIPPED | OUTPATIENT
Start: 2017-12-21 | End: 2018-07-17

## 2017-12-21 RX ORDER — CIPROFLOXACIN 500 MG/1
500 TABLET ORAL
COMMUNITY
End: 2018-01-03 | Stop reason: ALTCHOICE
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
## 2017-12-26 ENCOUNTER — TELEPHONE (OUTPATIENT)
Dept: UROGYNECOLOGY | Facility: CLINIC | Age: 47
End: 2017-12-26

## 2017-12-26 NOTE — TELEPHONE ENCOUNTER
----- Message from Jacque Rubio sent at 12/26/2017  8:49 AM CST -----  Contact: Patient   X _1st Request  _  2nd Request  _  3rd Request    Who:RYLAN BAE [[id number]]    Why:Patient states she needs her disability papers turned in by 12/28/2017 to her employer she is requesting a call back     What Number to Call Back:[phone]    When to Expect a call back: (Before the end of the day)   -- if call after 3:00 call back will be tomorrow.

## 2017-12-26 NOTE — TELEPHONE ENCOUNTER
Spoke to pt whom was concerned that her disability paperwork will not be in in time for her surgery, pt states that paperwork is awaiting doctor's signature, surgery is scheduled on 1/8/18, Explain to pt that doctor is out but I will discuss her concerns with BEN Espinosa to see if her paperwork has been sent back to disability. Pt voiced understanding and call was ended.  Called disability and asked them to send paperwork to BEN Espinosa so that she can do the final signature and paperwork can be sent out to pt employer before 12/28/17.

## 2017-12-29 ENCOUNTER — HOSPITAL ENCOUNTER (OUTPATIENT)
Dept: CARDIOLOGY | Facility: CLINIC | Age: 47
Discharge: HOME | End: 2017-12-29
Payer: COMMERCIAL

## 2017-12-29 DIAGNOSIS — Z01.818 PREOP EXAMINATION: ICD-10-CM

## 2017-12-29 PROCEDURE — 93000 ELECTROCARDIOGRAM COMPLETE: CPT | Mod: S$GLB,,, | Performed by: INTERNAL MEDICINE

## 2018-01-03 ENCOUNTER — OFFICE VISIT (OUTPATIENT)
Dept: UROGYNECOLOGY | Facility: CLINIC | Age: 48
End: 2018-01-03
Payer: COMMERCIAL

## 2018-01-03 VITALS
BODY MASS INDEX: 34.34 KG/M2 | SYSTOLIC BLOOD PRESSURE: 122 MMHG | HEIGHT: 63 IN | WEIGHT: 193.81 LBS | DIASTOLIC BLOOD PRESSURE: 84 MMHG

## 2018-01-03 DIAGNOSIS — N81.4 UTEROVAGINAL PROLAPSE: Primary | ICD-10-CM

## 2018-01-03 DIAGNOSIS — N89.8 VAGINAL ITCHING: ICD-10-CM

## 2018-01-03 DIAGNOSIS — D25.9 UTERINE LEIOMYOMA, UNSPECIFIED LOCATION: ICD-10-CM

## 2018-01-03 DIAGNOSIS — N92.1 METRORRHAGIA: ICD-10-CM

## 2018-01-03 DIAGNOSIS — N81.6 RECTOCELE: ICD-10-CM

## 2018-01-03 DIAGNOSIS — N81.11 MIDLINE CYSTOCELE: ICD-10-CM

## 2018-01-03 DIAGNOSIS — N39.3 STRESS INCONTINENCE: ICD-10-CM

## 2018-01-03 DIAGNOSIS — D25.9 UTERINE LEIOMYOMA, UNSPECIFIED LOCATION: Primary | ICD-10-CM

## 2018-01-03 DIAGNOSIS — R93.89 THICKENED ENDOMETRIUM: ICD-10-CM

## 2018-01-03 PROCEDURE — 99213 OFFICE O/P EST LOW 20 MIN: CPT | Mod: S$GLB,,, | Performed by: NURSE PRACTITIONER

## 2018-01-03 PROCEDURE — 99999 PR PBB SHADOW E&M-EST. PATIENT-LVL III: CPT | Mod: PBBFAC,,, | Performed by: NURSE PRACTITIONER

## 2018-01-03 PROCEDURE — 87480 CANDIDA DNA DIR PROBE: CPT

## 2018-01-03 NOTE — PROGRESS NOTES
Urogyn follow up  01/03/2018  .  [facility] [address] [place] [address]    Ingrid Gresham  [id number]  1970      Ingrid Gresham is a 47 y.o.  here for a urogyn follow up.    Last HPI from 08/25/2017  1.UI:  (+) GAMA   (--) UUI   (--) padsDaytime frequency: Q 3-4 hours.  Nocturia: No:  (--) dysuria,  (--) hematuria,  (--) frequent UTIs.  (+) complete bladder emptying.   2. POP:  Absent.  Symptoms:(--)  .  (--) vaginal bleeding. (--) vaginal discharge. (+) sexually active.  (--) dyspareunia.   (--)  Vaginal dryness.  (--) vaginal estrogen use.   3.BM:  (+) constipation/straining.  (--) chronic diarrhea. (--) hematochezia.  (--) fecal incontinence.  (--) fecal smearing/urgency.  (+) incomplete evacuation.  Complains of menorrhagia/ metrorrhagia*    Changes from last visit:  1)  UI:  (+) GAMA  (--) UUI    (--) pads: Daytime frequency: Q 4 hours.  Nocturia: Yes: 1/night.   (--) dysuria,  (--) hematuria,  (--) frequent UTIs.  (--) complete bladder emptying.     2)  POP:  Absent. Symptoms:(+)  Vaginal itching.  (--) vaginal bleeding. (--) vaginal discharge. (+) sexually active.  (--) dyspareunia.  (+)  Vaginal dryness.  (--) vaginal estrogen use.   POP-Q:  Aa -1; Ba -1; C -5; Ap -1; Bp -1; D -8.  Genital hiatus 3, perineal body 2 total vaginal length 11.  --bony pelvis feels small with minimal cervical descensus and fibroid uterus: TLH/ uterosacral suspension  --add MUS for GAMA     3)  BM:  (--) constipation/straining.  (--) chronic diarrhea. (--) hematochezia.  (--) fecal incontinence.  (--) fecal smearing/urgency.  (--) incomplete evacuation.      [facility]  09/14/2017  1.  VOIDING PHASE:       a.  Uroflowmetry:  · Prolapse reduction: No  · Voided volume:  Negative volume recorded   · PVR:   10 mL     The overall configuration of this uroflow study was with error precluding interpretation.       b.  Pressure flow:  · Prolapse reduction: No  · Voided volume:   375  mL  · Voiding time:   51 seconds  · Peak flow:  16 mL/s   · Avg flow:  8 mL/s  · Max det pressure:  110  cm H20  · Det pressure at max flow: 41 cm H20  · Void initiated by detrusor contraction, aided by valsalva.    · Urethral relaxation (EMG):  absent.    · PVR (calculated):  0 mL     The overall configuration of this pressure flow study was normal with some valsalva assist.       2.  FILLING PHASE:  · 1st desire: 104 mL  · Normal desire:  153 mL  · Strong desire:  221 mL  · Urgency:  356 mL  · Compliance (calculated)  40+ mL/cm H20  · EMG activity during filling:   stable  · Detrusor contractions observed: No.      3.  URETHRAL FUNCTION/STORAGE PHASE:     a.  WITHOUT prolapse reduction:  · CLPP (155 mL): Negative  at  132 cm H20  · VLPP (155 mL): Negative  at  81 cm H20   · CLPP (300 mL): Negative  at  147 cm H20  · VLPP (300 mL): Negative  at  58 cm H20   · CLPP (MAX ):    Positive  at  166 cm H20  · VLPP (MAX):     Positive  at  81 cm H20     These findings are consistent with Positive urodynamic stress incontinence.     EXAM:  POPQ: C -3 to -4, D -8.  Minimal cervical/uterine descensus on standing/valsalva, narrow pelvis.      Assessment:  UF not performed.  PF normal with some valsalva assist.  Compliance normal.  Max capacity 375 mL.  DO (--).  GAMA (+).      Cysto  Findings: Urethroscopy:  Normal with mild decreased in coaptation.  Cystoscopy:  Normal bladder mucosa, bilateral ureteral flow was noted.     Assessment: Essentially normal cystourethroscopy.      08/25/2017  Pelvis ultrasound    The uterus measures 10.3 x 5.5 x 6.0cm and demonstrates several masses that are suggestive of fibroids.  These range in size from 1.6 cm to 2.6 cm in greatest dimension.  One mass is associated with the endometrium and is indeterminate.  It could represent a submucosal fibroid however other endometrial pathology cannot be excluded.  The endometrial thickness is 17mm. There is no endometrial fluid. The uterus is otherwise  "unremarkable.  The right ovary was not definitively seen.  The left ovary measures 3.6 x 2.6 x 2.5 cm and demonstrates a 2.6 cm simple cyst.  The left ovary is otherwise unremarkable..  There is intatt vascular flow to the left ovary. There is no free fluid in the visualized pelvis.  Impression:  #1. Multiple uterine masses suggestive of fibroids.  One of the masses is associated with the endometrium and it could represent a submucosal fibroid however other endometrial pathology (polyp/carcinoma) cannot be excluded.  Further evaluation is recommended.  #2.  Right ovary not seen.  This is likely secondary to bowel gas artifact.    09/2017  embx  ENDOMETRIUM: BENIGN INACTIVE ENDOMETRIAL FRAGMENTS WITH AREAS OF TUBAL METAPLASIA.    Past Medical History:   Diagnosis Date    GERD (gastroesophageal reflux disease)     Preeclampsia 1991    Uterine fibroid 2016       Past Surgical History:   Procedure Laterality Date    SINUS SURGERY  1981    TUBAL LIGATION      "1 tube tied, 1 tube burned"       Current Outpatient Prescriptions   Medication Sig    fluticasone (FLONASE) 50 mcg/actuation nasal spray 1 spray by Each Nare route once daily.    diclofenac (VOLTAREN) 75 MG EC tablet Take 1 tablet (75 mg total) by mouth 2 (two) times daily.    promethazine (PHENERGAN) 25 MG tablet TAKE 1 TABLET(S) EVERY 6 HOURS BY ORAL ROUTE AS NEEDED.     No current facility-administered medications for this visit.        Well woman:  Pap:08/2017 normal  Mammo:08/25/2017--normal  Colonoscopy:n/a  Dexa: years ago---reports normal     ROS:  As per HPI.      Exam  /84 (BP Location: Right arm, Patient Position: Sitting)   Ht 5' 3" (1.6 m)   Wt 87.9 kg (193 lb 12.6 oz)   LMP 12/01/2017   BMI 34.33 kg/m²   General: alert and oriented, no acute distress  Respiratory: normal respiratory effort  Abd: soft, non-tender, non-distended    Pelvic--deferred    Impression  1. Uterine leiomyoma, unspecified location     2. Midline cystocele   "   3. Rectocele     4. Metrorrhagia     5. Vaginal itching  Vaginosis Screen by DNA Probe     We reviewed the above issues and discussed options for short-term versus long-term management of her problems.   Plan:   1. Patient consented with Dr. Bridges for laparoscopic hysterectomy, uterosacral suspension, possible anterior/posterior repair, midurethral sling, and cystourethroscopy.   R/B/A reviewed. Specific risks reviewed include:  infection, bleeding, need for blood transfusion, damage to surrounding structures, anesthesia risks, death, heart attack, stroke, mesh erosion/extrusion, pain, dyspareunia, urinary retention, voiding dysfunction, urinary incontinence, exacerbation of urinary urge incontinence, and need for further surgeries.  We reviewed potential for failure of POP defect repair and need for future surgery, with no way of predicting risk.  She understands success rate of ASC approaches 85%.  Success rate of midurethral sling for GAMA was reviewed as 80-85%, and she understands that this will not necessarily impact other types of urinary incontinence.  Alternatives reviewed include: pessary/PT for POP and pessary/periurethral injections/PT/medication for GAMA.    2. T&S, urine HCG on DOS  3. Preoperative appointment with PCP or cardiology: Yes - cleared  4. VTE Prophylaxis:  heparin 5000 u SQ TID (1st dose 2hrs preop) + SCDs  5. Patient instructed on Magnesium citrate and chlorahexadine/dial soap prep to perform day before & AM of surgery.   6. Proceed to OR for above-mentioned procedure.      30 minutes were spent in face to face time with this patient  75 % of this time was spent in counseling and/or coordination of care    Vianney Espinosa CLAUDIA-BC [facility]  Division of Female Pelvic Medicine and Reconstructive Surgery  Department of Obstetrics & Gynecology

## 2018-01-05 ENCOUNTER — PATIENT MESSAGE (OUTPATIENT)
Dept: UROGYNECOLOGY | Facility: CLINIC | Age: 48
End: 2018-01-05

## 2018-01-05 LAB
CANDIDA RRNA VAG QL PROBE: NEGATIVE
G VAGINALIS RRNA GENITAL QL PROBE: NEGATIVE
T VAGINALIS RRNA GENITAL QL PROBE: NEGATIVE

## 2018-01-06 ENCOUNTER — ANESTHESIA EVENT (OUTPATIENT)
Dept: SURGERY | Facility: HOSPITAL | Age: 48
End: 2018-01-06
Payer: COMMERCIAL

## 2018-01-08 ENCOUNTER — HOSPITAL ENCOUNTER (OUTPATIENT)
Facility: HOSPITAL | Age: 48
Discharge: HOME | End: 2018-01-09
Attending: OBSTETRICS & GYNECOLOGY | Admitting: OBSTETRICS & GYNECOLOGY
Payer: COMMERCIAL

## 2018-01-08 ENCOUNTER — ANESTHESIA (OUTPATIENT)
Dept: SURGERY | Facility: HOSPITAL | Age: 48
End: 2018-01-08
Payer: COMMERCIAL

## 2018-01-08 DIAGNOSIS — N92.0 MENORRHAGIA: ICD-10-CM

## 2018-01-08 DIAGNOSIS — N85.2 ENLARGED UTERUS: Primary | ICD-10-CM

## 2018-01-08 DIAGNOSIS — N39.3 STRESS INCONTINENCE IN FEMALE: ICD-10-CM

## 2018-01-08 DIAGNOSIS — Z90.710 STATUS POST LAPAROSCOPIC HYSTERECTOMY: ICD-10-CM

## 2018-01-08 LAB
ABO + RH BLD: NORMAL
B-HCG UR QL: NEGATIVE
BLD GP AB SCN CELLS X3 SERPL QL: NORMAL
CTP QC/QA: YES

## 2018-01-08 PROCEDURE — C1771 REP DEV, URINARY, W/SLING: HCPCS | Performed by: OBSTETRICS & GYNECOLOGY

## 2018-01-08 PROCEDURE — 27100025 HC TUBING, SET FLUID WARMER: Performed by: NURSE ANESTHETIST, CERTIFIED REGISTERED

## 2018-01-08 PROCEDURE — 63600175 PHARM REV CODE 636 W HCPCS: Performed by: OBSTETRICS & GYNECOLOGY

## 2018-01-08 PROCEDURE — 63600175 PHARM REV CODE 636 W HCPCS: Performed by: NURSE ANESTHETIST, CERTIFIED REGISTERED

## 2018-01-08 PROCEDURE — 57288 REPAIR BLADDER DEFECT: CPT | Mod: 51,,, | Performed by: OBSTETRICS & GYNECOLOGY

## 2018-01-08 PROCEDURE — S0028 INJECTION, FAMOTIDINE, 20 MG: HCPCS | Performed by: NURSE ANESTHETIST, CERTIFIED REGISTERED

## 2018-01-08 PROCEDURE — D9220A PRA ANESTHESIA: Mod: ANES,,, | Performed by: ANESTHESIOLOGY

## 2018-01-08 PROCEDURE — 88307 TISSUE EXAM BY PATHOLOGIST: CPT

## 2018-01-08 PROCEDURE — 81025 URINE PREGNANCY TEST: CPT | Performed by: OBSTETRICS & GYNECOLOGY

## 2018-01-08 PROCEDURE — 88307 TISSUE EXAM BY PATHOLOGIST: CPT | Mod: 26,,,

## 2018-01-08 PROCEDURE — 27201423 OPTIME MED/SURG SUP & DEVICES STERILE SUPPLY: Performed by: OBSTETRICS & GYNECOLOGY

## 2018-01-08 PROCEDURE — 71000033 HC RECOVERY, INTIAL HOUR: Performed by: OBSTETRICS & GYNECOLOGY

## 2018-01-08 PROCEDURE — 37000008 HC ANESTHESIA 1ST 15 MINUTES: Performed by: OBSTETRICS & GYNECOLOGY

## 2018-01-08 PROCEDURE — 63600175 PHARM REV CODE 636 W HCPCS: Performed by: STUDENT IN AN ORGANIZED HEALTH CARE EDUCATION/TRAINING PROGRAM

## 2018-01-08 PROCEDURE — C1765 ADHESION BARRIER: HCPCS | Performed by: OBSTETRICS & GYNECOLOGY

## 2018-01-08 PROCEDURE — 25000003 PHARM REV CODE 250: Performed by: OBSTETRICS & GYNECOLOGY

## 2018-01-08 PROCEDURE — 58571 TLH W/T/O 250 G OR LESS: CPT | Mod: ,,, | Performed by: OBSTETRICS & GYNECOLOGY

## 2018-01-08 PROCEDURE — 63600175 PHARM REV CODE 636 W HCPCS: Performed by: ANESTHESIOLOGY

## 2018-01-08 PROCEDURE — 25000003 PHARM REV CODE 250: Performed by: NURSE ANESTHETIST, CERTIFIED REGISTERED

## 2018-01-08 PROCEDURE — 94799 UNLISTED PULMONARY SVC/PX: CPT

## 2018-01-08 PROCEDURE — 86901 BLOOD TYPING SEROLOGIC RH(D): CPT

## 2018-01-08 PROCEDURE — D9220A PRA ANESTHESIA: Mod: CRNA,,, | Performed by: NURSE ANESTHETIST, CERTIFIED REGISTERED

## 2018-01-08 PROCEDURE — 36000710: Performed by: OBSTETRICS & GYNECOLOGY

## 2018-01-08 PROCEDURE — 27000221 HC OXYGEN, UP TO 24 HOURS

## 2018-01-08 PROCEDURE — 25000003 PHARM REV CODE 250: Performed by: STUDENT IN AN ORGANIZED HEALTH CARE EDUCATION/TRAINING PROGRAM

## 2018-01-08 PROCEDURE — 36000711: Performed by: OBSTETRICS & GYNECOLOGY

## 2018-01-08 PROCEDURE — C2628 CATHETER, OCCLUSION: HCPCS | Performed by: OBSTETRICS & GYNECOLOGY

## 2018-01-08 PROCEDURE — 71000039 HC RECOVERY, EACH ADD'L HOUR: Performed by: OBSTETRICS & GYNECOLOGY

## 2018-01-08 PROCEDURE — 37000009 HC ANESTHESIA EA ADD 15 MINS: Performed by: OBSTETRICS & GYNECOLOGY

## 2018-01-08 DEVICE — SLING HALO SHAPE OBTRYX II: Type: IMPLANTABLE DEVICE | Site: VAGINA | Status: FUNCTIONAL

## 2018-01-08 DEVICE — BARRIER INTERCEED 3X4 ST DIS: Type: IMPLANTABLE DEVICE | Site: ABDOMEN | Status: FUNCTIONAL

## 2018-01-08 RX ORDER — DOCUSATE SODIUM 100 MG/1
100 CAPSULE, LIQUID FILLED ORAL 2 TIMES DAILY
Status: DISCONTINUED | OUTPATIENT
Start: 2018-01-08 | End: 2018-01-09 | Stop reason: HOSPADM

## 2018-01-08 RX ORDER — LIDOCAINE HYDROCHLORIDE AND EPINEPHRINE 20; 10 MG/ML; UG/ML
INJECTION, SOLUTION INFILTRATION; PERINEURAL
Status: DISCONTINUED | OUTPATIENT
Start: 2018-01-08 | End: 2018-01-08 | Stop reason: HOSPADM

## 2018-01-08 RX ORDER — CEFAZOLIN SODIUM 1 G/3ML
2 INJECTION, POWDER, FOR SOLUTION INTRAMUSCULAR; INTRAVENOUS
Status: COMPLETED | OUTPATIENT
Start: 2018-01-08 | End: 2018-01-08

## 2018-01-08 RX ORDER — DIPHENHYDRAMINE HCL 25 MG
25 CAPSULE ORAL EVERY 4 HOURS PRN
Status: DISCONTINUED | OUTPATIENT
Start: 2018-01-08 | End: 2018-01-09 | Stop reason: HOSPADM

## 2018-01-08 RX ORDER — FENTANYL CITRATE 50 UG/ML
INJECTION, SOLUTION INTRAMUSCULAR; INTRAVENOUS
Status: DISCONTINUED | OUTPATIENT
Start: 2018-01-08 | End: 2018-01-08

## 2018-01-08 RX ORDER — BUPIVACAINE HYDROCHLORIDE 2.5 MG/ML
INJECTION, SOLUTION EPIDURAL; INFILTRATION; INTRACAUDAL
Status: DISCONTINUED | OUTPATIENT
Start: 2018-01-08 | End: 2018-01-08 | Stop reason: HOSPADM

## 2018-01-08 RX ORDER — IBUPROFEN 400 MG/1
400 TABLET ORAL EVERY 6 HOURS
Status: DISCONTINUED | OUTPATIENT
Start: 2018-01-08 | End: 2018-01-09 | Stop reason: HOSPADM

## 2018-01-08 RX ORDER — SODIUM CHLORIDE, SODIUM LACTATE, POTASSIUM CHLORIDE, CALCIUM CHLORIDE 600; 310; 30; 20 MG/100ML; MG/100ML; MG/100ML; MG/100ML
INJECTION, SOLUTION INTRAVENOUS CONTINUOUS
Status: DISCONTINUED | OUTPATIENT
Start: 2018-01-08 | End: 2018-01-09 | Stop reason: HOSPADM

## 2018-01-08 RX ORDER — HYDROMORPHONE HYDROCHLORIDE 2 MG/ML
1 INJECTION, SOLUTION INTRAMUSCULAR; INTRAVENOUS; SUBCUTANEOUS EVERY 4 HOURS PRN
Status: DISCONTINUED | OUTPATIENT
Start: 2018-01-08 | End: 2018-01-09

## 2018-01-08 RX ORDER — ONDANSETRON 8 MG/1
8 TABLET, ORALLY DISINTEGRATING ORAL EVERY 8 HOURS PRN
Status: DISCONTINUED | OUTPATIENT
Start: 2018-01-08 | End: 2018-01-09 | Stop reason: HOSPADM

## 2018-01-08 RX ORDER — HYDROMORPHONE HYDROCHLORIDE 2 MG/ML
0.2 INJECTION, SOLUTION INTRAMUSCULAR; INTRAVENOUS; SUBCUTANEOUS EVERY 5 MIN PRN
Status: DISCONTINUED | OUTPATIENT
Start: 2018-01-08 | End: 2018-01-08 | Stop reason: HOSPADM

## 2018-01-08 RX ORDER — LIDOCAINE HCL/PF 100 MG/5ML
SYRINGE (ML) INTRAVENOUS
Status: DISCONTINUED | OUTPATIENT
Start: 2018-01-08 | End: 2018-01-08

## 2018-01-08 RX ORDER — SODIUM CHLORIDE 9 MG/ML
INJECTION, SOLUTION INTRAVENOUS CONTINUOUS PRN
Status: DISCONTINUED | OUTPATIENT
Start: 2018-01-08 | End: 2018-01-08

## 2018-01-08 RX ORDER — HYDROMORPHONE HYDROCHLORIDE 2 MG/ML
INJECTION, SOLUTION INTRAMUSCULAR; INTRAVENOUS; SUBCUTANEOUS
Status: DISCONTINUED | OUTPATIENT
Start: 2018-01-08 | End: 2018-01-08

## 2018-01-08 RX ORDER — FAMOTIDINE 10 MG/ML
INJECTION INTRAVENOUS
Status: DISCONTINUED | OUTPATIENT
Start: 2018-01-08 | End: 2018-01-08

## 2018-01-08 RX ORDER — METOCLOPRAMIDE HYDROCHLORIDE 5 MG/ML
10 INJECTION INTRAMUSCULAR; INTRAVENOUS EVERY 10 MIN PRN
Status: DISCONTINUED | OUTPATIENT
Start: 2018-01-08 | End: 2018-01-08 | Stop reason: HOSPADM

## 2018-01-08 RX ORDER — DEXAMETHASONE SODIUM PHOSPHATE 4 MG/ML
INJECTION, SOLUTION INTRA-ARTICULAR; INTRALESIONAL; INTRAMUSCULAR; INTRAVENOUS; SOFT TISSUE
Status: DISCONTINUED | OUTPATIENT
Start: 2018-01-08 | End: 2018-01-08

## 2018-01-08 RX ORDER — METOCLOPRAMIDE HYDROCHLORIDE 5 MG/ML
5 INJECTION INTRAMUSCULAR; INTRAVENOUS EVERY 6 HOURS PRN
Status: DISCONTINUED | OUTPATIENT
Start: 2018-01-08 | End: 2018-01-09 | Stop reason: HOSPADM

## 2018-01-08 RX ORDER — NEOSTIGMINE METHYLSULFATE 1 MG/ML
INJECTION, SOLUTION INTRAVENOUS
Status: DISCONTINUED | OUTPATIENT
Start: 2018-01-08 | End: 2018-01-08

## 2018-01-08 RX ORDER — PROPOFOL 10 MG/ML
VIAL (ML) INTRAVENOUS
Status: DISCONTINUED | OUTPATIENT
Start: 2018-01-08 | End: 2018-01-08

## 2018-01-08 RX ORDER — ACETAMINOPHEN 10 MG/ML
INJECTION, SOLUTION INTRAVENOUS
Status: DISCONTINUED | OUTPATIENT
Start: 2018-01-08 | End: 2018-01-08

## 2018-01-08 RX ORDER — ROCURONIUM BROMIDE 10 MG/ML
INJECTION, SOLUTION INTRAVENOUS
Status: DISCONTINUED | OUTPATIENT
Start: 2018-01-08 | End: 2018-01-08

## 2018-01-08 RX ORDER — OXYCODONE AND ACETAMINOPHEN 5; 325 MG/1; MG/1
1 TABLET ORAL EVERY 4 HOURS PRN
Status: DISCONTINUED | OUTPATIENT
Start: 2018-01-08 | End: 2018-01-09

## 2018-01-08 RX ORDER — ONDANSETRON 2 MG/ML
INJECTION INTRAMUSCULAR; INTRAVENOUS
Status: DISCONTINUED | OUTPATIENT
Start: 2018-01-08 | End: 2018-01-08

## 2018-01-08 RX ORDER — SIMETHICONE 80 MG
80 TABLET,CHEWABLE ORAL EVERY 4 HOURS PRN
Status: DISCONTINUED | OUTPATIENT
Start: 2018-01-08 | End: 2018-01-09 | Stop reason: HOSPADM

## 2018-01-08 RX ORDER — MIDAZOLAM HYDROCHLORIDE 1 MG/ML
INJECTION, SOLUTION INTRAMUSCULAR; INTRAVENOUS
Status: DISCONTINUED | OUTPATIENT
Start: 2018-01-08 | End: 2018-01-08

## 2018-01-08 RX ORDER — GLYCOPYRROLATE 0.2 MG/ML
INJECTION INTRAMUSCULAR; INTRAVENOUS
Status: DISCONTINUED | OUTPATIENT
Start: 2018-01-08 | End: 2018-01-08

## 2018-01-08 RX ORDER — OXYCODONE AND ACETAMINOPHEN 10; 325 MG/1; MG/1
1 TABLET ORAL EVERY 4 HOURS PRN
Status: DISCONTINUED | OUTPATIENT
Start: 2018-01-08 | End: 2018-01-09

## 2018-01-08 RX ADMIN — HYDROMORPHONE HYDROCHLORIDE 0.2 MG: 2 INJECTION INTRAMUSCULAR; INTRAVENOUS; SUBCUTANEOUS at 04:01

## 2018-01-08 RX ADMIN — GLYCOPYRROLATE 0.4 MG: 0.2 INJECTION, SOLUTION INTRAMUSCULAR; INTRAVENOUS at 02:01

## 2018-01-08 RX ADMIN — DEXAMETHASONE SODIUM PHOSPHATE 8 MG: 4 INJECTION, SOLUTION INTRAMUSCULAR; INTRAVENOUS at 10:01

## 2018-01-08 RX ADMIN — NEOSTIGMINE METHYLSULFATE 4 MG: 1 INJECTION INTRAVENOUS at 02:01

## 2018-01-08 RX ADMIN — CEFAZOLIN 2 G: 330 INJECTION, POWDER, FOR SOLUTION INTRAMUSCULAR; INTRAVENOUS at 02:01

## 2018-01-08 RX ADMIN — FAMOTIDINE 20 MG: 10 INJECTION, SOLUTION INTRAVENOUS at 10:01

## 2018-01-08 RX ADMIN — ROCURONIUM BROMIDE 10 MG: 10 INJECTION, SOLUTION INTRAVENOUS at 12:01

## 2018-01-08 RX ADMIN — IBUPROFEN 400 MG: 200 TABLET, FILM COATED ORAL at 06:01

## 2018-01-08 RX ADMIN — HYDROMORPHONE HYDROCHLORIDE 0.5 MG: 2 INJECTION INTRAMUSCULAR; INTRAVENOUS; SUBCUTANEOUS at 01:01

## 2018-01-08 RX ADMIN — PROPOFOL 120 MG: 10 INJECTION, EMULSION INTRAVENOUS at 10:01

## 2018-01-08 RX ADMIN — CEFAZOLIN 2 G: 330 INJECTION, POWDER, FOR SOLUTION INTRAMUSCULAR; INTRAVENOUS at 11:01

## 2018-01-08 RX ADMIN — MIDAZOLAM HYDROCHLORIDE 2 MG: 1 INJECTION, SOLUTION INTRAMUSCULAR; INTRAVENOUS at 10:01

## 2018-01-08 RX ADMIN — OXYCODONE HYDROCHLORIDE AND ACETAMINOPHEN 1 TABLET: 10; 325 TABLET ORAL at 09:01

## 2018-01-08 RX ADMIN — SODIUM CHLORIDE, SODIUM GLUCONATE, SODIUM ACETATE, POTASSIUM CHLORIDE, MAGNESIUM CHLORIDE, SODIUM PHOSPHATE, DIBASIC, AND POTASSIUM PHOSPHATE: .53; .5; .37; .037; .03; .012; .00082 INJECTION, SOLUTION INTRAVENOUS at 02:01

## 2018-01-08 RX ADMIN — FENTANYL CITRATE 50 MCG: 50 INJECTION, SOLUTION INTRAMUSCULAR; INTRAVENOUS at 12:01

## 2018-01-08 RX ADMIN — LIDOCAINE HYDROCHLORIDE 80 MG: 20 INJECTION, SOLUTION INTRAVENOUS at 10:01

## 2018-01-08 RX ADMIN — OXYCODONE HYDROCHLORIDE AND ACETAMINOPHEN 1 TABLET: 10; 325 TABLET ORAL at 04:01

## 2018-01-08 RX ADMIN — SODIUM CHLORIDE, SODIUM GLUCONATE, SODIUM ACETATE, POTASSIUM CHLORIDE, MAGNESIUM CHLORIDE, SODIUM PHOSPHATE, DIBASIC, AND POTASSIUM PHOSPHATE: .53; .5; .37; .037; .03; .012; .00082 INJECTION, SOLUTION INTRAVENOUS at 11:01

## 2018-01-08 RX ADMIN — ACETAMINOPHEN 1000 MG: 10 INJECTION, SOLUTION INTRAVENOUS at 11:01

## 2018-01-08 RX ADMIN — HYDROMORPHONE HYDROCHLORIDE 1 MG: 2 INJECTION INTRAMUSCULAR; INTRAVENOUS; SUBCUTANEOUS at 10:01

## 2018-01-08 RX ADMIN — IBUPROFEN 400 MG: 200 TABLET, FILM COATED ORAL at 11:01

## 2018-01-08 RX ADMIN — SODIUM CHLORIDE: 0.9 INJECTION, SOLUTION INTRAVENOUS at 10:01

## 2018-01-08 RX ADMIN — SODIUM CHLORIDE, SODIUM LACTATE, POTASSIUM CHLORIDE, AND CALCIUM CHLORIDE: 600; 310; 30; 20 INJECTION, SOLUTION INTRAVENOUS at 04:01

## 2018-01-08 RX ADMIN — ROCURONIUM BROMIDE 50 MG: 10 INJECTION, SOLUTION INTRAVENOUS at 10:01

## 2018-01-08 RX ADMIN — ROCURONIUM BROMIDE 10 MG: 10 INJECTION, SOLUTION INTRAVENOUS at 01:01

## 2018-01-08 RX ADMIN — FENTANYL CITRATE 100 MCG: 50 INJECTION, SOLUTION INTRAMUSCULAR; INTRAVENOUS at 10:01

## 2018-01-08 RX ADMIN — DOCUSATE SODIUM 100 MG: 100 CAPSULE, LIQUID FILLED ORAL at 09:01

## 2018-01-08 RX ADMIN — ONDANSETRON 4 MG: 2 INJECTION INTRAMUSCULAR; INTRAVENOUS at 02:01

## 2018-01-08 RX ADMIN — FENTANYL CITRATE 50 MCG: 50 INJECTION, SOLUTION INTRAMUSCULAR; INTRAVENOUS at 11:01

## 2018-01-08 NOTE — BRIEF OP NOTE
[facility]  Brief Operative Note    SUMMARY     Surgery Date: 1/8/2018     Surgeon(s) and Role:     * Cass Bridges MD - Primary     * Barbara Arzola MD - Resident - Assisting    Pre-op Diagnosis:   1. Stress incontinence [N39.3]  2. Metrorrhagia/menorrhagia    Post-op Diagnosis:    Same    Procedure(s) (LRB):  HYSTERECTOMY-TOTAL LAPAROSCOPIC (TLH) (N/A)  SUSPENSION-VAULT-VAGINAL (N/A)  SLING-MID URETHRAL (N/A)  CYSTOSCOPY (N/A)    Anesthesia: General    Description of the findings of the procedure:  1. Uterus 10cm on bimanual exam. Uterus sounded to 8cm.  2. Uterus globular in appearance with appearance of anterior fibroid   3. Adhesions between the left fallopian tube and sidewall peritoneum and bowel - taken down  4. Appearance of prior tubal ligation bilaterally  5. 2-3cm simple appearing cyst to left ovary  6. Otherwise normal appearing uterus, bilateral tubes and ovaries  7. Normal upper abdominal survey  8. Cystoscopy with bilateral ureteral efflux  9. Hemostasis noted at the conclusion of the case.    Estimated Blood Loss: 200 mL         Specimens:   Specimen (12h ago through future)    Start     Ordered    01/08/18 1442  Specimen to Pathology - Surgery  Once     Comments:  1-Uterus, Cervix, Bilateral tubes-perm      01/08/18 1443        Barbara Arzola MD  PGY-3 OB/GYN  [phone]

## 2018-01-08 NOTE — TRANSFER OF CARE
"Anesthesia Transfer of Care Note    Patient: Ingrid Gresham    Procedure(s) Performed: Procedure(s) (LRB):  HYSTERECTOMY-TOTAL LAPAROSCOPIC (TLH) (N/A)  SUSPENSION-VAULT-VAGINAL (N/A)  SLING-MID URETHRAL (N/A)  CYSTOSCOPY (N/A)    Patient location: PACU    Anesthesia Type: general    Transport from OR: Transported from OR on 6-10 L/min O2 by face mask with adequate spontaneous ventilation    Post pain: adequate analgesia    Post assessment: no apparent anesthetic complications    Post vital signs: stable    Level of consciousness: sedated and responds to stimulation    Nausea/Vomiting: no nausea/vomiting    Complications: none    Transfer of care protocol was followed      Last vitals:   Visit Vitals  BP (!) 146/68 (BP Location: Left arm, Patient Position: Lying)   Pulse 86   Temp 36.7 °C (98.1 °F) (Oral)   Resp 18   Ht 5' 3" (1.6 m)   Wt 85.7 kg (189 lb)   SpO2 99%   BMI 33.48 kg/m²     "

## 2018-01-08 NOTE — ANESTHESIA POSTPROCEDURE EVALUATION
"Anesthesia Post Evaluation    Patient: Ingrid Gresham    Procedure(s) Performed: Procedure(s) (LRB):  HYSTERECTOMY-TOTAL LAPAROSCOPIC (TLH) (N/A)  SUSPENSION-VAULT-VAGINAL (N/A)  SLING-MID URETHRAL (N/A)  CYSTOSCOPY (N/A)    Final Anesthesia Type: general  Patient location during evaluation: PACU  Patient participation: Yes- Able to Participate  Level of consciousness: awake and alert and oriented  Post-procedure vital signs: reviewed and stable  Pain management: adequate  Airway patency: patent  PONV status at discharge: No PONV  Anesthetic complications: no      Cardiovascular status: blood pressure returned to baseline and hemodynamically stable  Respiratory status: unassisted, room air and spontaneous ventilation  Hydration status: euvolemic  Follow-up not needed.        Visit Vitals  BP (!) 151/76 (BP Location: Right arm, Patient Position: Lying)   Pulse 91   Temp 37.4 °C (99.4 °F) (Oral)   Resp 18   Ht 5' 3" (1.6 m)   Wt 85.7 kg (189 lb)   SpO2 96%   BMI 33.48 kg/m²       Pain/Ruby Score: Pain Assessment Performed: Yes (1/8/2018  3:40 PM)  Presence of Pain: non-verbal indicators absent (1/8/2018  3:40 PM)  Pain Rating Prior to Med Admin: 6 (1/8/2018  4:35 PM)  Ruby Score: 7 (1/8/2018  3:40 PM)      "

## 2018-01-08 NOTE — INTERVAL H&P NOTE
The patient has been examined and the H&P has been reviewed:    I concur with the findings and no changes have occurred since H&P was written.    Surgery risks, benefits and alternative options discussed and understood by patient/family.          Active Hospital Problems    Diagnosis  POA    Menorrhagia [N92.0]  Yes      Resolved Hospital Problems    Diagnosis Date Resolved POA   No resolved problems to display.     Barbara Arzola MD  PGY-3 OB/GYN  [phone]

## 2018-01-08 NOTE — ANESTHESIA PREPROCEDURE EVALUATION
01/08/2018  Ingrid Gresham is a 47 y.o., female.    Anesthesia Evaluation    I have reviewed the Patient Summary Reports.     I have reviewed the Medications.     Review of Systems  Anesthesia Hx:  No problems with previous Anesthesia Denies Hx of Anesthetic complications  History of prior surgery of interest to airway management or planning: Denies Family Hx of Anesthesia complications.   Denies Personal Hx of Anesthesia complications.   Social:  Former Smoker, No Alcohol Use    Hematology/Oncology:  Hematology Normal   Oncology Normal     EENT/Dental:EENT/Dental Normal   Cardiovascular:   Hypertension, well controlled Denies Dysrhythmias.   Denies Angina.    Pulmonary:  Pulmonary Normal  Denies Asthma.  Denies Shortness of breath.  Denies Recent URI.    Renal/:   Stress incontinence    Uterine fibroid   Hepatic/GI:   GERD, well controlled    Musculoskeletal:  Musculoskeletal Normal    Neurological:  Neurology Normal    Endocrine:  Endocrine Normal    Dermatological:  Skin Normal    Psych:  Psychiatric Normal           Physical Exam  General:  Well nourished, Obesity    Airway/Jaw/Neck:  Airway Findings: Mouth Opening: Normal Tongue: Normal  General Airway Assessment: Adult  Mallampati: II  TM Distance: Normal, at least 6 cm  Jaw/Neck Findings:  Neck ROM: Normal ROM     Eyes/Ears/Nose:  EYES/EARS/NOSE FINDINGS: Normal   Dental:  Dental Findings: In tact   Chest/Lungs:  Chest/Lungs Findings: Normal Respiratory Rate     Heart/Vascular:  Heart Findings: Rate: Normal  Rhythm: Regular Rhythm     Abdomen:  Abdomen Findings: Normal    Musculoskeletal:  Musculoskeletal Findings: Normal   Skin:  Skin Findings: Normal    Mental Status:  Mental Status Findings:  Alert and Oriented, Cooperative         Anesthesia Plan  Type of Anesthesia, risks & benefits discussed:  Anesthesia Type:  general  Patient's  Preference: General  Intra-op Monitoring Plan: standard ASA monitors  Intra-op Monitoring Plan Comments:   Post Op Pain Control Plan: per primary service following discharge from PACU and IV/PO Opioids PRN  Post Op Pain Control Plan Comments:   Induction:   IV  Beta Blocker:  Patient is not currently on a Beta-Blocker (No further documentation required).       Informed Consent: Patient understands risks and agrees with Anesthesia plan.  Questions answered. Anesthesia consent signed with patient.  ASA Score: 2     Day of Surgery Review of History & Physical:    H&P update referred to the surgeon.         Ready For Surgery From Anesthesia Perspective.

## 2018-01-08 NOTE — H&P (VIEW-ONLY)
Urogyn follow up  01/03/2018  .  [facility] [address] [place] [address]    Ingrid Gresham  [id number]  1970      Ingrid Gresham is a 47 y.o.  here for a urogyn follow up.    Last HPI from 08/25/2017  1.UI:  (+) GAMA   (--) UUI   (--) padsDaytime frequency: Q 3-4 hours.  Nocturia: No:  (--) dysuria,  (--) hematuria,  (--) frequent UTIs.  (+) complete bladder emptying.   2. POP:  Absent.  Symptoms:(--)  .  (--) vaginal bleeding. (--) vaginal discharge. (+) sexually active.  (--) dyspareunia.   (--)  Vaginal dryness.  (--) vaginal estrogen use.   3.BM:  (+) constipation/straining.  (--) chronic diarrhea. (--) hematochezia.  (--) fecal incontinence.  (--) fecal smearing/urgency.  (+) incomplete evacuation.  Complains of menorrhagia/ metrorrhagia*    Changes from last visit:  1)  UI:  (+) GAMA  (--) UUI    (--) pads: Daytime frequency: Q 4 hours.  Nocturia: Yes: 1/night.   (--) dysuria,  (--) hematuria,  (--) frequent UTIs.  (--) complete bladder emptying.     2)  POP:  Absent. Symptoms:(+)  Vaginal itching.  (--) vaginal bleeding. (--) vaginal discharge. (+) sexually active.  (--) dyspareunia.  (+)  Vaginal dryness.  (--) vaginal estrogen use.   POP-Q:  Aa -1; Ba -1; C -5; Ap -1; Bp -1; D -8.  Genital hiatus 3, perineal body 2 total vaginal length 11.  --bony pelvis feels small with minimal cervical descensus and fibroid uterus: TLH/ uterosacral suspension  --add MUS for GAMA     3)  BM:  (--) constipation/straining.  (--) chronic diarrhea. (--) hematochezia.  (--) fecal incontinence.  (--) fecal smearing/urgency.  (--) incomplete evacuation.      [facility]  09/14/2017  1.  VOIDING PHASE:       a.  Uroflowmetry:  · Prolapse reduction: No  · Voided volume:  Negative volume recorded   · PVR:   10 mL     The overall configuration of this uroflow study was with error precluding interpretation.       b.  Pressure flow:  · Prolapse reduction: No  · Voided volume:   375  mL  · Voiding time:   51 seconds  · Peak flow:  16 mL/s   · Avg flow:  8 mL/s  · Max det pressure:  110  cm H20  · Det pressure at max flow: 41 cm H20  · Void initiated by detrusor contraction, aided by valsalva.    · Urethral relaxation (EMG):  absent.    · PVR (calculated):  0 mL     The overall configuration of this pressure flow study was normal with some valsalva assist.       2.  FILLING PHASE:  · 1st desire: 104 mL  · Normal desire:  153 mL  · Strong desire:  221 mL  · Urgency:  356 mL  · Compliance (calculated)  40+ mL/cm H20  · EMG activity during filling:   stable  · Detrusor contractions observed: No.      3.  URETHRAL FUNCTION/STORAGE PHASE:     a.  WITHOUT prolapse reduction:  · CLPP (155 mL): Negative  at  132 cm H20  · VLPP (155 mL): Negative  at  81 cm H20   · CLPP (300 mL): Negative  at  147 cm H20  · VLPP (300 mL): Negative  at  58 cm H20   · CLPP (MAX ):    Positive  at  166 cm H20  · VLPP (MAX):     Positive  at  81 cm H20     These findings are consistent with Positive urodynamic stress incontinence.     EXAM:  POPQ: C -3 to -4, D -8.  Minimal cervical/uterine descensus on standing/valsalva, narrow pelvis.      Assessment:  UF not performed.  PF normal with some valsalva assist.  Compliance normal.  Max capacity 375 mL.  DO (--).  GAMA (+).      Cysto  Findings: Urethroscopy:  Normal with mild decreased in coaptation.  Cystoscopy:  Normal bladder mucosa, bilateral ureteral flow was noted.     Assessment: Essentially normal cystourethroscopy.      08/25/2017  Pelvis ultrasound    The uterus measures 10.3 x 5.5 x 6.0cm and demonstrates several masses that are suggestive of fibroids.  These range in size from 1.6 cm to 2.6 cm in greatest dimension.  One mass is associated with the endometrium and is indeterminate.  It could represent a submucosal fibroid however other endometrial pathology cannot be excluded.  The endometrial thickness is 17mm. There is no endometrial fluid. The uterus is otherwise  "unremarkable.  The right ovary was not definitively seen.  The left ovary measures 3.6 x 2.6 x 2.5 cm and demonstrates a 2.6 cm simple cyst.  The left ovary is otherwise unremarkable..  There is intatt vascular flow to the left ovary. There is no free fluid in the visualized pelvis.  Impression:  #1. Multiple uterine masses suggestive of fibroids.  One of the masses is associated with the endometrium and it could represent a submucosal fibroid however other endometrial pathology (polyp/carcinoma) cannot be excluded.  Further evaluation is recommended.  #2.  Right ovary not seen.  This is likely secondary to bowel gas artifact.    09/2017  embx  ENDOMETRIUM: BENIGN INACTIVE ENDOMETRIAL FRAGMENTS WITH AREAS OF TUBAL METAPLASIA.    Past Medical History:   Diagnosis Date    GERD (gastroesophageal reflux disease)     Preeclampsia 1991    Uterine fibroid 2016       Past Surgical History:   Procedure Laterality Date    SINUS SURGERY  1981    TUBAL LIGATION      "1 tube tied, 1 tube burned"       Current Outpatient Prescriptions   Medication Sig    fluticasone (FLONASE) 50 mcg/actuation nasal spray 1 spray by Each Nare route once daily.    diclofenac (VOLTAREN) 75 MG EC tablet Take 1 tablet (75 mg total) by mouth 2 (two) times daily.    promethazine (PHENERGAN) 25 MG tablet TAKE 1 TABLET(S) EVERY 6 HOURS BY ORAL ROUTE AS NEEDED.     No current facility-administered medications for this visit.        Well woman:  Pap:08/2017 normal  Mammo:08/25/2017--normal  Colonoscopy:n/a  Dexa: years ago---reports normal     ROS:  As per HPI.      Exam  /84 (BP Location: Right arm, Patient Position: Sitting)   Ht 5' 3" (1.6 m)   Wt 87.9 kg (193 lb 12.6 oz)   LMP 12/01/2017   BMI 34.33 kg/m²   General: alert and oriented, no acute distress  Respiratory: normal respiratory effort  Abd: soft, non-tender, non-distended    Pelvic--deferred    Impression  1. Uterine leiomyoma, unspecified location     2. Midline cystocele   "   3. Rectocele     4. Metrorrhagia     5. Vaginal itching  Vaginosis Screen by DNA Probe     We reviewed the above issues and discussed options for short-term versus long-term management of her problems.   Plan:   1. Patient consented with Dr. Bridges for laparoscopic hysterectomy, uterosacral suspension, possible anterior/posterior repair, midurethral sling, and cystourethroscopy.   R/B/A reviewed. Specific risks reviewed include:  infection, bleeding, need for blood transfusion, damage to surrounding structures, anesthesia risks, death, heart attack, stroke, mesh erosion/extrusion, pain, dyspareunia, urinary retention, voiding dysfunction, urinary incontinence, exacerbation of urinary urge incontinence, and need for further surgeries.  We reviewed potential for failure of POP defect repair and need for future surgery, with no way of predicting risk.  She understands success rate of ASC approaches 85%.  Success rate of midurethral sling for GAMA was reviewed as 80-85%, and she understands that this will not necessarily impact other types of urinary incontinence.  Alternatives reviewed include: pessary/PT for POP and pessary/periurethral injections/PT/medication for GAMA.    2. T&S, urine HCG on DOS  3. Preoperative appointment with PCP or cardiology: Yes - cleared  4. VTE Prophylaxis:  heparin 5000 u SQ TID (1st dose 2hrs preop) + SCDs  5. Patient instructed on Magnesium citrate and chlorahexadine/dial soap prep to perform day before & AM of surgery.   6. Proceed to OR for above-mentioned procedure.      30 minutes were spent in face to face time with this patient  75 % of this time was spent in counseling and/or coordination of care    Vianney Espinosa CLAUDIA-BC [facility]  Division of Female Pelvic Medicine and Reconstructive Surgery  Department of Obstetrics & Gynecology

## 2018-01-09 VITALS
HEIGHT: 63 IN | OXYGEN SATURATION: 95 % | SYSTOLIC BLOOD PRESSURE: 128 MMHG | WEIGHT: 189 LBS | RESPIRATION RATE: 18 BRPM | HEART RATE: 85 BPM | BODY MASS INDEX: 33.49 KG/M2 | TEMPERATURE: 98 F | DIASTOLIC BLOOD PRESSURE: 60 MMHG

## 2018-01-09 LAB
BASOPHILS # BLD AUTO: 0.04 K/UL
BASOPHILS NFR BLD: 0.2 %
DIFFERENTIAL METHOD: ABNORMAL
EOSINOPHIL # BLD AUTO: 0 K/UL
EOSINOPHIL NFR BLD: 0 %
ERYTHROCYTE [DISTWIDTH] IN BLOOD BY AUTOMATED COUNT: 14.9 %
HCT VFR BLD AUTO: 34.3 %
HGB BLD-MCNC: 11.1 G/DL
IMM GRANULOCYTES # BLD AUTO: 0.12 K/UL
IMM GRANULOCYTES NFR BLD AUTO: 0.6 %
LYMPHOCYTES # BLD AUTO: 0.9 K/UL
LYMPHOCYTES NFR BLD: 4.9 %
MCH RBC QN AUTO: 27.1 PG
MCHC RBC AUTO-ENTMCNC: 32.4 G/DL
MCV RBC AUTO: 84 FL
MONOCYTES # BLD AUTO: 1.1 K/UL
MONOCYTES NFR BLD: 5.8 %
NEUTROPHILS # BLD AUTO: 16.9 K/UL
NEUTROPHILS NFR BLD: 88.5 %
NRBC BLD-RTO: 0 /100 WBC
PLATELET # BLD AUTO: 263 K/UL
PMV BLD AUTO: 11.1 FL
RBC # BLD AUTO: 4.09 M/UL
WBC # BLD AUTO: 19.14 K/UL

## 2018-01-09 PROCEDURE — 63600175 PHARM REV CODE 636 W HCPCS: Performed by: STUDENT IN AN ORGANIZED HEALTH CARE EDUCATION/TRAINING PROGRAM

## 2018-01-09 PROCEDURE — 25000003 PHARM REV CODE 250: Performed by: STUDENT IN AN ORGANIZED HEALTH CARE EDUCATION/TRAINING PROGRAM

## 2018-01-09 PROCEDURE — 85025 COMPLETE CBC W/AUTO DIFF WBC: CPT

## 2018-01-09 PROCEDURE — 25000003 PHARM REV CODE 250: Performed by: OBSTETRICS & GYNECOLOGY

## 2018-01-09 PROCEDURE — 36415 COLL VENOUS BLD VENIPUNCTURE: CPT

## 2018-01-09 RX ORDER — OXYCODONE AND ACETAMINOPHEN 5; 325 MG/1; MG/1
1 TABLET ORAL EVERY 4 HOURS PRN
Qty: 20 TABLET | Refills: 0 | Status: SHIPPED | OUTPATIENT
Start: 2018-01-09 | End: 2018-01-09 | Stop reason: HOSPADM

## 2018-01-09 RX ORDER — OXYCODONE AND ACETAMINOPHEN 5; 325 MG/1; MG/1
1 TABLET ORAL EVERY 4 HOURS PRN
Qty: 20 TABLET | Refills: 0 | Status: SHIPPED | OUTPATIENT
Start: 2018-01-09 | End: 2018-01-09

## 2018-01-09 RX ORDER — HYDROCODONE BITARTRATE AND ACETAMINOPHEN 5; 325 MG/1; MG/1
1 TABLET ORAL EVERY 6 HOURS PRN
Qty: 20 TABLET | Refills: 0 | Status: SHIPPED | OUTPATIENT
Start: 2018-01-09 | End: 2018-01-19

## 2018-01-09 RX ORDER — DOCUSATE SODIUM 100 MG/1
100 CAPSULE, LIQUID FILLED ORAL 2 TIMES DAILY
Refills: 0 | COMMUNITY
Start: 2018-01-09 | End: 2018-07-17

## 2018-01-09 RX ORDER — IBUPROFEN 400 MG/1
400 TABLET ORAL EVERY 6 HOURS
Qty: 30 TABLET | Refills: 1 | Status: SHIPPED | OUTPATIENT
Start: 2018-01-09 | End: 2018-02-21

## 2018-01-09 RX ORDER — HYDROCODONE BITARTRATE AND ACETAMINOPHEN 10; 325 MG/1; MG/1
1 TABLET ORAL EVERY 4 HOURS PRN
Status: DISCONTINUED | OUTPATIENT
Start: 2018-01-09 | End: 2018-01-09

## 2018-01-09 RX ORDER — HYDROCODONE BITARTRATE AND ACETAMINOPHEN 5; 325 MG/1; MG/1
2 TABLET ORAL EVERY 6 HOURS PRN
Status: DISCONTINUED | OUTPATIENT
Start: 2018-01-09 | End: 2018-01-09 | Stop reason: HOSPADM

## 2018-01-09 RX ORDER — HYDROCODONE BITARTRATE AND ACETAMINOPHEN 5; 325 MG/1; MG/1
1 TABLET ORAL EVERY 4 HOURS PRN
Status: DISCONTINUED | OUTPATIENT
Start: 2018-01-09 | End: 2018-01-09

## 2018-01-09 RX ORDER — CALCIUM CARBONATE 200(500)MG
500 TABLET,CHEWABLE ORAL DAILY PRN
Status: DISCONTINUED | OUTPATIENT
Start: 2018-01-09 | End: 2018-01-09 | Stop reason: HOSPADM

## 2018-01-09 RX ORDER — PANTOPRAZOLE SODIUM 40 MG/1
40 TABLET, DELAYED RELEASE ORAL DAILY
Status: DISCONTINUED | OUTPATIENT
Start: 2018-01-09 | End: 2018-01-09 | Stop reason: HOSPADM

## 2018-01-09 RX ADMIN — IBUPROFEN 400 MG: 200 TABLET, FILM COATED ORAL at 11:01

## 2018-01-09 RX ADMIN — DOCUSATE SODIUM 100 MG: 100 CAPSULE, LIQUID FILLED ORAL at 09:01

## 2018-01-09 RX ADMIN — OXYCODONE HYDROCHLORIDE AND ACETAMINOPHEN 1 TABLET: 10; 325 TABLET ORAL at 03:01

## 2018-01-09 RX ADMIN — ONDANSETRON 8 MG: 8 TABLET, ORALLY DISINTEGRATING ORAL at 11:01

## 2018-01-09 RX ADMIN — IBUPROFEN 400 MG: 200 TABLET, FILM COATED ORAL at 06:01

## 2018-01-09 RX ADMIN — PANTOPRAZOLE SODIUM 40 MG: 40 TABLET, DELAYED RELEASE ORAL at 09:01

## 2018-01-09 RX ADMIN — HYDROMORPHONE HYDROCHLORIDE 1 MG: 2 INJECTION INTRAMUSCULAR; INTRAVENOUS; SUBCUTANEOUS at 06:01

## 2018-01-09 RX ADMIN — HYDROCODONE BITARTRATE AND ACETAMINOPHEN 1 TABLET: 5; 325 TABLET ORAL at 03:01

## 2018-01-09 NOTE — NURSING
Pt ready for D/C home. Pt has tolerated regular diet; states adequate pain control. Rx and D/C instructions given -- understanding verbalized.

## 2018-01-09 NOTE — HPI
Ms. Gresham is a 46yo who presented for scheduled TLH/BS/transobturator tension-free midurethral sling/cystourethroscopy 2/2 menorrhagia/metrorrhagia/GAMA.

## 2018-01-09 NOTE — PROGRESS NOTES
Voiding trial done at 11:30 am: Pt did not feel urge to urinate, but ambulated to BR and tried -- voided 60 ml yellow urine, with scant amt s/s drainage noted. Post void bladder scan reading 167 ml. Informed MD on call. Will continue to monitor.

## 2018-01-09 NOTE — DISCHARGE SUMMARY
[facility]  Obstetrics & Gynecology  Discharge Summary    Patient Name: Ingrid Gresham  MRN: [id number]  Admission Date: 1/8/2018  Hospital Length of Stay: 0 days  Discharge Date and Time: 01/09/2018, 1420   Attending Physician: Cass Bridges MD   Discharging Provider: Barbara Arzola MD  Primary Care Provider: Brenda Malik RN    HPI:  Ms. Gresham is a 46yo who presented for scheduled TLH/BS/transobturator tension-free midurethral sling/cystourethroscopy 2/2 menorrhagia/metrorrhagia/GAMA.    Hospital Course:  01/08/2018 - To the OR for scheduled TLH/BS/transobturator tension-free midurethral sling/cystourethroscopy   01/09/2018 - Meeting postoperative goals. Anticipate discharge home today.    Procedure(s) (LRB):  HYSTERECTOMY-TOTAL LAPAROSCOPIC (TLH) (N/A)  SUSPENSION-VAULT-VAGINAL (N/A)  SLING-MID URETHRAL (N/A)  CYSTOSCOPY (N/A)       Pending Diagnostic Studies:     None        Final Active Diagnoses:    Diagnosis Date Noted POA    Menorrhagia [N92.0] 01/08/2018 Yes    Status post laparoscopic hysterectomy [Z90.710] 01/08/2018 No    Stress incontinence in female [N39.3] 08/27/2017 Yes      Problems Resolved During this Admission:    Diagnosis Date Noted Date Resolved POA        Discharged Condition: good    Disposition:     Follow Up:  Follow-up Information     Cass Bridges MD In 6 weeks.    Specialties:  Gynecology, Urology  Why:  Postoperative check  Contact information:  [address] [place] [id number]  [phone]                 Patient Instructions:   No discharge procedures on file.  Medications:  Reconciled Home Medications:   Current Discharge Medication List      START taking these medications    Details   docusate sodium (COLACE) 100 MG capsule Take 1 capsule (100 mg total) by mouth 2 (two) times daily.  Refills: 0      hydrocodone-acetaminophen 5-325mg (NORCO) 5-325 mg per tablet Take 1 tablet by mouth every 6 (six) hours as needed for Pain.  Qty:  20 tablet, Refills: 0      ibuprofen (ADVIL,MOTRIN) 400 MG tablet Take 1 tablet (400 mg total) by mouth every 6 (six) hours.  Qty: 30 tablet, Refills: 1         CONTINUE these medications which have NOT CHANGED    Details   diclofenac (VOLTAREN) 75 MG EC tablet Take 1 tablet (75 mg total) by mouth 2 (two) times daily.  Qty: 30 tablet, Refills: 0      fluticasone (FLONASE) 50 mcg/actuation nasal spray 1 spray by Each Nare route once daily.  Qty: 1 Bottle, Refills: 3    Associated Diagnoses: Preoperative clearance      promethazine (PHENERGAN) 25 MG tablet TAKE 1 TABLET(S) EVERY 6 HOURS BY ORAL ROUTE AS NEEDED.  Qty: 20 tablet, Refills: 0             Barbara Arzola MD  Obstetrics & Gynecology  [facility]

## 2018-01-09 NOTE — OP NOTE
Date of procedure:  1/8/2018  Title of Operation:  1)  Total laparoscopic hysterectomy  2)  Laparoscopic bilateral salpingectomy  3)  Placement of transobturator tension-free midurethral sling, Obtryx II (e-channel)  4)  Cystourethroscopy    Indications for Surgery:  1.UI:  (+) GAMA   (--) UUI   (--) padsDaytime frequency: Q 3-4 hours.  Nocturia: No:  (--) dysuria,  (--) hematuria,  (--) frequent UTIs.  (+) complete bladder emptying.   2. POP:  Absent.  Symptoms:(--)  .  (--) vaginal bleeding. (--) vaginal discharge. (+) sexually active.  (--) dyspareunia.   (--)  Vaginal dryness.  (--) vaginal estrogen use.   3.BM:  (+) constipation/straining.  (--) chronic diarrhea. (--) hematochezia.  (--) fecal incontinence.  (--) fecal smearing/urgency.  (+) incomplete evacuation.  Complains of menorrhagia/ metrorrhagia*     Changes from last visit:  1)  UI:  (+) GAMA  (--) UUI    (--) pads: Daytime frequency: Q 4 hours.  Nocturia: Yes: 1/night.   (--) dysuria,  (--) hematuria,  (--) frequent UTIs.  (--) complete bladder emptying.      2)  POP:  Absent. Symptoms:(+)  Vaginal itching.  (--) vaginal bleeding. (--) vaginal discharge. (+) sexually active.  (--) dyspareunia.  (+)  Vaginal dryness.  (--) vaginal estrogen use.   POP-Q:  Aa -1; Ba -1; C -5; Ap -1; Bp -1; D -8.  Genital hiatus 3, perineal body 2 total vaginal length 11.  --bony pelvis feels small with minimal cervical descensus and fibroid uterus: TLH/ uterosacral suspension  --add MUS for GAMA     3)  BM:  (--) constipation/straining.  (--) chronic diarrhea. (--) hematochezia.  (--) fecal incontinence.  (--) fecal smearing/urgency.  (--) incomplete evacuation.       Suds  09/14/2017  1.  VOIDING PHASE:       a.  Uroflowmetry:  · Prolapse reduction: No  · Voided volume:  Negative volume recorded   · PVR:   10 mL     The overall configuration of this uroflow study was with error precluding interpretation.       b.  Pressure flow:  · Prolapse reduction: No  · Voided  volume:   375 mL  · Voiding time:   51 seconds  · Peak flow:  16 mL/s   · Avg flow:  8 mL/s  · Max det pressure:  110  cm H20  · Det pressure at max flow: 41 cm H20  · Void initiated by detrusor contraction, aided by valsalva.    · Urethral relaxation (EMG):  absent.    · PVR (calculated):  0 mL     The overall configuration of this pressure flow study was normal with some valsalva assist.       2.  FILLING PHASE:  · 1st desire: 104 mL  · Normal desire:  153 mL  · Strong desire:  221 mL  · Urgency:  356 mL  · Compliance (calculated)  40+ mL/cm H20  · EMG activity during filling:   stable  · Detrusor contractions observed: No.      3.  URETHRAL FUNCTION/STORAGE PHASE:     a.  WITHOUT prolapse reduction:  · CLPP (155 mL): Negative  at  132 cm H20  · VLPP (155 mL): Negative  at  81 cm H20   · CLPP (300 mL): Negative  at  147 cm H20  · VLPP (300 mL): Negative  at  58 cm H20   · CLPP (MAX ):    Positive  at  166 cm H20  · VLPP (MAX):     Positive  at  81 cm H20     These findings are consistent with Positive urodynamic stress incontinence.     EXAM:  POPQ: C -3 to -4, D -8.  Minimal cervical/uterine descensus on standing/valsalva, narrow pelvis.      Assessment:  UF not performed.  PF normal with some valsalva assist.  Compliance normal.  Max capacity 375 mL.  DO (--).  GAMA (+).       Cysto  Findings: Urethroscopy:  Normal with mild decreased in coaptation.  Cystoscopy:  Normal bladder mucosa, bilateral ureteral flow was noted.     Assessment: Essentially normal cystourethroscopy.       08/25/2017  Pelvis ultrasound    The uterus measures 10.3 x 5.5 x 6.0cm and demonstrates several masses that are suggestive of fibroids.  These range in size from 1.6 cm to 2.6 cm in greatest dimension.  One mass is associated with the endometrium and is indeterminate.  It could represent a submucosal fibroid however other endometrial pathology cannot be excluded.  The endometrial thickness is 17mm. There is no endometrial fluid. The  uterus is otherwise unremarkable.  The right ovary was not definitively seen.  The left ovary measures 3.6 x 2.6 x 2.5 cm and demonstrates a 2.6 cm simple cyst.  The left ovary is otherwise unremarkable..  There is intatt vascular flow to the left ovary. There is no free fluid in the visualized pelvis.  Impression:  #1. Multiple uterine masses suggestive of fibroids.  One of the masses is associated with the endometrium and it could represent a submucosal fibroid however other endometrial pathology (polyp/carcinoma) cannot be excluded.  Further evaluation is recommended.  #2.  Right ovary not seen.  This is likely secondary to bowel gas artifact.     09/2017  embx  ENDOMETRIUM: BENIGN INACTIVE ENDOMETRIAL FRAGMENTS WITH AREAS OF TUBAL METAPLASIA.     Preoperative Diagnosis:  1. Uterine leiomyoma, unspecified location      2. Midline cystocele      3. Rectocele      4. Metrorrhagia       Postoperative Diagnosis:  1. Uterine leiomyoma, unspecified location      2. Midline cystocele      3. Rectocele      4. Metrorrhagia       Anesthesia:  General endotracheal anesthesia.  Additionally, 0.25% marcaine was injected prior to each laparoscopic port placement, and a dilute solution of 1% lidocaine with epinephrine was injected vaginally for local anesthesia.    Specimen (Bacteriological, Pathological or other):  Uterus and cervix  Bilateral fallopian tubes    Prosthetic Device/Implant:  1)  Obtryx II.  LOT# [id number].      Surgeons Narrative:    Surgeon: Cass Bridges MD    Assistants:  Barbara Arzola MD ([id number])     Intravenous Fluids:  2200 mL     Estimated Blood Loss:  200 mL     Urine Output:  725 mL     Counts:  Sponge, lap, needle counts correct x 2.     Drains: Denny catheter.     Disposition:  The patient was sent to the PACU in stable condition.     Findings:     1.  On exam under anesthesia,  normal external female genitalia. There was not prolapse as previously noted in clinic.  Uterus and cervix: Enlarged 10  weeks' size  Adnexa: normal bimanual exam.     2.  On laparoscopic survey:    --The bowel was grossly Normal.    --The uterus and cervix were present and Normal. Enlarged, globular fundus.  Bilateral fallopian tubes with evidence of previous tubal ligation.  Right ovary normal, left ovary with simple-appearing cyst, concerning for corpus luteal cyst.   --The liver margin Normal.   --The gall bladder was present and Normal.   --Bilateral ureters were visualized and noted to vermiculate at the start and close of the case.    --Adhesions were present: from the left fallopian tube to colon--lysed carefully with electrocautery.  There was also general scarring of the peritoneum over the cul-de-sac/posterior uterus, concerning for endometriosis. Uterosacral ligaments were not well-defined bilaterally.   --Other findings included:  none     3.  On cystoscopy, the bladder mucosa was Normal.  After TLH/bilateral salpingectomy/placement of transobturator sling, there was no suture or mesh within the bladder mucosa.  The ureteral orifices were visualized bilaterally with (+) noted good efflux x 2. On a systematic survey of the bladder dome to the base of the urethrovesical junction, there were not other abnormalities noted. The urethra was normal on retraction of the scope.    4.  Rectal exam:  At the close of the case, rectal exam was performed.  There was no suture, mesh, or other injury noted on exam.  No obvious masses were palpated.     Description of procedure:    The patient was identified in the preoperative area where informed consent was confirmed, and she was taken to the operating room where an adequate level of general anesthesia was obtained.  The patient was positioned in lithotomy position with legs in Gustabo stirrups. Care was taken to avoid joint hyperflexion or hyperextension, and all extremity surfaces were carefully padded so as to minimize risk of neurologic injury. Intravenous antibiotics were  administered preoperatively. Sequential compression devices were applied to the patient's lower extremities preoperatively and heparin was administered subcutaneously for VTE prophylaxis.  Surgical time-out was performed, where the patient was identified and procedures confirmed.  An examination under anesthesia was performed with findings described as above.  The patient's abdomen, perineum, and vagina were sterilely prepped and draped. A manzanares catheter was placed in the bladder for drainage.      A weighted speculum was placed in the vagina.  The cervix was identified, the posterior lip was grasped with a single toothed tenaculum, and stay sutures of 0-vicryl were placed in the anterior and posterior cervix.  The uterus was sounded, and the appropriate uterine manipulator and KOH colpotomizer were selected.  The OSVALDO/KOH apparatus was assembled, and the uterine manipulator was advanced into the uterine cavity until the KOH colpotomizer was secured optimally around the cervix.  The uterine manipulator was inflated to secure the device, and a vaginal occluder balloon was placed distally to the OSVALDO/KOH and inflated until the vaginal cavity was occluded.      Attention was turned abdominally, where after injection of Marcaine locally, an incision was made at the umbilicus, through which a 10 mm Xcel trocar and sleeve were placed with laparoscopic visualization. Proper intraperitoneal placement was confirmed with a pneumoperitoneum of carbon dioxide gas obtained to a pressure of no greater than 15 mmHg. After injection of the skin with Marcaine locally, an incision was made at the left lower quadrant, 2 fingerbreadths cephalad and 2 fingerbreadths medial to the anterior-superior iliac spine, through which a 10 mm laparoscopic trocar and sleeve were advanced under direct visualization with the laparoscope. This procedure was repeated on the patient's right lower quadrant. Care was taken to stay medial of the inferior  epigastric vessels.  Finally, a 5 mm laparoscopic trocar and sleeve were advanced to the left abdominal wall along the midclavicular line at approximately the level of the umbilicus, 1 hand width cephalad to the 10 mm trocar. All trocars were placed without complication.  The patient was placed in Trendelenburg and the bowel was displaced gently from the pelvis. Careful laparoscopic survey of the pelvis and abdomenrevealed findings as detailed above. Bilateral ureters were visualized retroperitoneally and noted to vermiculate.     We began the case with total laparoscopic hysterectomy and bilateral salpingectomy.  The ureters were visualized bilaterally. First, adhesions from the colon to the right fallopian tube and mesosalpinx were lysed carefully using electrocautery.  Decision had previously been made to remove bilateral tubes.  Sequentially, the right  round, and broad ligaments were electrodessicated with bipolar cautery.  The anterior and posterior sheaths of the round ligament were gently , the uterine vessels were exposed, and electrodessicated.  Using sharp dissection, the vesicouterine fold was created, and the bladder was carefully dissected off the anterior vagina.   Next, these same steps were repeated along the patient's left side.  Excellent hemostasis was noted.  Using the monopolar vianney, the anterior colpotomy was created along the KOH device and considered circumferentially until the entire uterine specimen was freed from the pelvis.  The specimen was then handed to pathology for further evaluation.  The vaginal cuff was closed with several figure-of-eight stitches of 0-vicryl.  Excellent hemostasis was noted.     At this point, laparoscopic portion of the procedure was completed. The pelvis was irrigated, and all irrigants were removed.  Intercede was placed along all suture lines and planes of dissection to discourage future adhesion formation.  The abdominal wall fascia was closed at  the 10 mm laparoscopic port sites with an Endoclose suture carrier with stitches of 0 Vicryl. This was done under direct laparoscopic visualization. The abdomen was deflated and all laparoscopic sleeves and other instruments were removed from the abdomen. All laparoscopic skin incisions were closed with subcuticular stitches of 4-0 Monocryl and secured with steri strips.  Excellent cosmesis and hemostasis was noted.             Next, we placed the transobturator tension-free mid urethral sling:  Obtryx II. The vaginal epithelium underlying the mid urethra was grasped, and a subepithelial injection was made with 1% lidocaine with epinephrine.  A 1 cm vertical incision was made, and the underlying vesicovaginal fibromuscular connective tissue was dissected off of the vaginal epithelium bilaterally to the junction between the pubic bone with the pubic rami.  Before making skin incisions for entry of the vaginal sling, we palpated the edge of the ischiopubic ramus just inferior to the insertion of the adductor longus tendon.  A small stab incision was made in this area with a clean knife bilaterally.  We confirmed that the incisions were approximately the level of the clitoris.  The helical needle was then grasped and inserted into the stab wound made on the patient's right side.  The needle was inserted perpendicular to the skin, curving around the pubic ramus, towards an opposing index finger which was inserted under the vaginal epithelium. The mesh arm was then attached to the needle, and the needle was retracted back along its path of introduction. The steps were then repeated on the patient's left side which resulted in placement of the Obtryx II sling at the level of the midurethra.  After both mesh arms had been placed, a cystourethroscopy was performed with findings as noted above. There were no abnormalities or injury. The sling was then tensioned with a 10 mm Hegar dilator between the sling and the midurethra.  The plastic sheaths were released along each sling arm by sharply transecting the stay suture.  The sheaths were removed, and the excess mesh arms were trimmed at the level of the skin.  The midurethral plastic midline marker was then removed sharply.  The groin sites were then closed with Dermabond. The vaginal epithelium was closed several mattress sutures of 2-0 Vicryl.  Excellent hemostasis was noted.    Vaginal exam revealed good support of all walls, and no further repairs were needed.     At the close of the case, all counts were correct x2.  The vagina was irrigated, and all irrigants were removed.  The vagina was packed with a role of Kerlix, coated with Premarin cream for assurance of immediate postop hemostasis.  The Denny was in place and draining well.  The patient was awakened from general endotracheal anesthesia and was taken to the Recovery Room in stable condition.  She tolerated the procedure well.    I was present and scrubbed for the entire procedure.

## 2018-01-09 NOTE — PROGRESS NOTES
[facility]  Obstetrics & Gynecology  Progress Note    Patient Name: Ingrid Gresham  MRN: [id number]  Admission Date: 1/8/2018  Primary Care Provider: Brenda Malik RN  Principal Problem: <principal problem not specified>    Subjective:     HPI:  Ms. Gresham is a 48yo who presented for scheduled TLH/BS/transobturator tension-free midurethral sling/cystourethroscopy 2/2 menorrhagia/metrorrhagia/GAMA.    Interval History: She reports mild pain adequately relieved with IV and PO pain medication (Percocet 10 x 3, Dilaudid x 1). Tolerating clear liquid diet with no N/V. Denies flatus/BM. Denny catheter in place. Has not yet ambulated.     Scheduled Meds:   docusate sodium  100 mg Oral BID    ibuprofen  400 mg Oral Q6H     Continuous Infusions:   lactated ringers 125 mL/hr at 01/08/18 1628     PRN Meds:diphenhydrAMINE, HYDROmorphone, metoclopramide HCl, ondansetron, oxyCODONE-acetaminophen, oxyCODONE-acetaminophen, simethicone    Review of patient's allergies indicates:  No Known Allergies    Objective:     Vital Signs (Most Recent):  Temp: 98.5 °F (36.9 °C) (01/09/18 0445)  Pulse: 86 (01/09/18 0445)  Resp: 18 (01/09/18 0445)  BP: 132/68 (01/09/18 0445)  SpO2: 96 % (01/09/18 0445) Vital Signs (24h Range):  Temp:  [98.1 °F (36.7 °C)-99.4 °F (37.4 °C)] 98.5 °F (36.9 °C)  Pulse:  [83-95] 86  Resp:  [10-18] 18  SpO2:  [93 %-99 %] 96 %  BP: (124-151)/(56-84) 132/68     Weight: 85.7 kg (189 lb)  Body mass index is 33.48 kg/m².  No LMP recorded.    I&O (Last 24H):      Intake/Output Summary (Last 24 hours) at 01/09/18 0638      UOP 66cc/h, last recorded at 2100      Laboratory:  CBC:   Recent Labs  Lab 01/09/18  0421   WBC 19.14*   RBC 4.09   HGB 11.1*   HCT 34.3*      MCV 84   MCH 27.1   MCHC 32.4     Physical Exam:   Constitutional: She appears well-developed and well-nourished. No distress.       Cardiovascular: Normal rate.     Pulmonary/Chest: Effort normal.        Abdominal: Soft.  She exhibits abdominal incision (c/d/i). She exhibits no distension. There is tenderness (appropriate postoperative, mid and left pelvis).   steristrips in place, c/d/i x4     Genitourinary:   Genitourinary Comments: Vaginal packing pulled, <25% saturated           Musculoskeletal: She exhibits no edema.       Neurological: She is alert.    Skin: Skin is warm and dry.    Psychiatric: She has a normal mood and affect. Her behavior is normal. Judgment and thought content normal.       Assessment/Plan:     Status post laparoscopic hysterectomy    - continue routine postop care.   - TUMS PRN GERD  - continue pain control with percocet/Ibuprofen Dilaudid prn for breakthrough  - continue regular diet. Saline lock IV.  - ambulate TID. IS bedside.  - S/p vaginal packing. Will discontinue manzanares in with passive VT.   - H/h 13/40-->11.1/34.3 (EBL 200cc)  - WBC 19            Menorrhagia    - s/p TLH/BS        Stress incontinence in female    - s/p transobturator sling            Barbara Arzola MD  Obstetrics & Gynecology  [facility]

## 2018-01-09 NOTE — ASSESSMENT & PLAN NOTE
- continue routine postop care.   - TUMS PRN GERD  - continue pain control with percocet/Ibuprofen Dilaudid prn for breakthrough  - continue regular diet. Saline lock IV.  - ambulate TID. IS bedside.  - S/p vaginal packing. Will discontinue manzanares in with passive VT.   - H/h 13/40-->11.1/34.3 (EBL 200cc)  - WBC 19

## 2018-01-09 NOTE — PROGRESS NOTES
Pt ambulated in becerra -- francisca well, states feeling better now. Pt voided 200 ml cheyanne urine. Post void bladder scan reading 21 ml.

## 2018-01-09 NOTE — NURSING
Received to room 545B, awake, alert and oriented x3, vital signs stable, moves all extremities without diff, 4 lap sites intact with steri-strips, hypoactive bs, c/o 5/10 pain with movement from stretcher to bed, manzanares in place, draining clear yellow urine, ivf infusing to lt hand #18, will continue to monitor.

## 2018-01-09 NOTE — NURSING TRANSFER
Nursing Transfer Note      1/8/2018     Transfer 545-B    Transfer via : stretcher    Transfer with : Room air    Transported by NURSE    Medicines sent: No    Chart send with patient: Yes

## 2018-01-09 NOTE — HOSPITAL COURSE
01/08/2018 - To the OR for scheduled TLH/BS/transobturator tension-free midurethral sling/cystourethroscopy   01/09/2018 - Meeting postoperative goals. Anticipate discharge home today.

## 2018-01-09 NOTE — SUBJECTIVE & OBJECTIVE
Interval History: She reports mild pain adequately relieved with IV and PO pain medication (Percocet 10 x 3, Dilaudid x 1). Tolerating clear liquid diet with no N/V. Denies flatus/BM. Denny catheter in place. Has not yet ambulated.     Scheduled Meds:   docusate sodium  100 mg Oral BID    ibuprofen  400 mg Oral Q6H     Continuous Infusions:   lactated ringers 125 mL/hr at 01/08/18 1628     PRN Meds:diphenhydrAMINE, HYDROmorphone, metoclopramide HCl, ondansetron, oxyCODONE-acetaminophen, oxyCODONE-acetaminophen, simethicone    Review of patient's allergies indicates:  No Known Allergies    Objective:     Vital Signs (Most Recent):  Temp: 98.5 °F (36.9 °C) (01/09/18 0445)  Pulse: 86 (01/09/18 0445)  Resp: 18 (01/09/18 0445)  BP: 132/68 (01/09/18 0445)  SpO2: 96 % (01/09/18 0445) Vital Signs (24h Range):  Temp:  [98.1 °F (36.7 °C)-99.4 °F (37.4 °C)] 98.5 °F (36.9 °C)  Pulse:  [83-95] 86  Resp:  [10-18] 18  SpO2:  [93 %-99 %] 96 %  BP: (124-151)/(56-84) 132/68     Weight: 85.7 kg (189 lb)  Body mass index is 33.48 kg/m².  No LMP recorded.    I&O (Last 24H):      Intake/Output Summary (Last 24 hours) at 01/09/18 0638      UOP 66cc/h, last recorded at 2100      Laboratory:  CBC:   Recent Labs  Lab 01/09/18  0421   WBC 19.14*   RBC 4.09   HGB 11.1*   HCT 34.3*      MCV 84   MCH 27.1   MCHC 32.4     Physical Exam:   Constitutional: She appears well-developed and well-nourished. No distress.       Cardiovascular: Normal rate.     Pulmonary/Chest: Effort normal.        Abdominal: Soft. She exhibits abdominal incision (c/d/i). She exhibits no distension. There is tenderness (appropriate postoperative, mid and left pelvis).   steristrips in place, c/d/i x4     Genitourinary:   Genitourinary Comments: Vaginal packing pulled, <25% saturated           Musculoskeletal: She exhibits no edema.       Neurological: She is alert.    Skin: Skin is warm and dry.    Psychiatric: She has a normal mood and affect. Her behavior is  normal. Judgment and thought content normal.

## 2018-01-15 ENCOUNTER — TELEPHONE (OUTPATIENT)
Dept: UROGYNECOLOGY | Facility: CLINIC | Age: 48
End: 2018-01-15

## 2018-01-15 NOTE — TELEPHONE ENCOUNTER
Complains of vaginal pain.  Has been walking a good amount.  Denies vaginal bleeding.  Instructed to decrease physical activity and to continue ibuprofen as directed.  Verbalized understanding.  Vianney Espinosa, DARLINP-BC

## 2018-01-15 NOTE — TELEPHONE ENCOUNTER
----- Message from Cata Huffman sent at 1/15/2018  3:19 PM CST -----  Contact: RYLAN BAE [[id number]]  _x  1st Request  _  2nd Request  _  3rd Request        Who: RYLAN BAE [[id number]]    Why: patient states she has been having pain inside the fissures after having surgery a week ago and would like a call back to discuss of that is normal. Please advise.     What Number to Call Back: [phone]    When to Expect a call back: (Before the end of the day)   -- if call after 3:00 call back will be tomorrow.

## 2018-01-15 NOTE — TELEPHONE ENCOUNTER
Pt states she's having vaginal pain. Pt denies any bleeding, nausea, vomiting or fever. Pt declined an appointment today and states her  cannot bring her. Encouraged pt to continue taking her ibuprofen for the time being and NP Francisca will give her a call. Pt voiced understanding and call ended.

## 2018-02-08 ENCOUNTER — OFFICE VISIT (OUTPATIENT)
Dept: INTERNAL MEDICINE | Facility: CLINIC | Age: 48
End: 2018-02-08
Payer: COMMERCIAL

## 2018-02-08 VITALS
SYSTOLIC BLOOD PRESSURE: 124 MMHG | HEART RATE: 104 BPM | OXYGEN SATURATION: 98 % | BODY MASS INDEX: 32.66 KG/M2 | TEMPERATURE: 98 F | DIASTOLIC BLOOD PRESSURE: 80 MMHG | HEIGHT: 63 IN | WEIGHT: 184.31 LBS

## 2018-02-08 DIAGNOSIS — J06.9 URI WITH COUGH AND CONGESTION: Primary | ICD-10-CM

## 2018-02-08 PROCEDURE — 99999 PR PBB SHADOW E&M-EST. PATIENT-LVL III: CPT | Mod: PBBFAC,,, | Performed by: INTERNAL MEDICINE

## 2018-02-08 PROCEDURE — 99213 OFFICE O/P EST LOW 20 MIN: CPT | Mod: S$GLB,,, | Performed by: INTERNAL MEDICINE

## 2018-02-08 PROCEDURE — 3008F BODY MASS INDEX DOCD: CPT | Mod: S$GLB,,, | Performed by: INTERNAL MEDICINE

## 2018-02-08 PROCEDURE — 99213 OFFICE O/P EST LOW 20 MIN: CPT | Performed by: INTERNAL MEDICINE

## 2018-02-08 RX ORDER — PROMETHAZINE HYDROCHLORIDE AND CODEINE PHOSPHATE 6.25; 1 MG/5ML; MG/5ML
5 SOLUTION ORAL EVERY 6 HOURS PRN
Qty: 180 ML | Refills: 0 | Status: SHIPPED | OUTPATIENT
Start: 2018-02-08 | End: 2018-02-18

## 2018-02-08 NOTE — PROGRESS NOTES
Subjective:       Patient ID: Ingrid Gresham is a 47 y.o. female.    Chief Complaint: Cough (cough is dry hurt the lower back and rib cage)    Cough   This is a new problem. The current episode started in the past 7 days. The problem has been gradually improving. The problem occurs every few minutes. The cough is non-productive. Associated symptoms include chest pain, headaches, myalgias and a sore throat. Pertinent negatives include no chills, ear pain, fever, postnasal drip, rash, shortness of breath or wheezing. Nothing aggravates the symptoms. She has tried OTC cough suppressant and prescription cough suppressant for the symptoms. The treatment provided mild relief. There is no history of asthma, bronchitis or environmental allergies.     Review of Systems   Constitutional: Negative for activity change, chills, fatigue and fever.   HENT: Positive for sore throat. Negative for congestion, ear pain, nosebleeds, postnasal drip and sinus pressure.    Eyes: Negative.  Negative for visual disturbance.   Respiratory: Positive for cough. Negative for chest tightness, shortness of breath and wheezing.    Cardiovascular: Positive for chest pain.   Gastrointestinal: Negative for abdominal pain, diarrhea, nausea and vomiting.   Genitourinary: Negative for difficulty urinating, dysuria, frequency and urgency.   Musculoskeletal: Positive for myalgias. Negative for arthralgias and neck stiffness.   Skin: Negative for rash.   Allergic/Immunologic: Negative for environmental allergies.   Neurological: Positive for headaches. Negative for dizziness and weakness.   Psychiatric/Behavioral: Negative for sleep disturbance. The patient is not nervous/anxious.        Objective:      Physical Exam   Constitutional: She is oriented to person, place, and time. She appears well-developed and well-nourished.  Non-toxic appearance. No distress.   HENT:   Head: Normocephalic and atraumatic.   Right Ear: Tympanic membrane, external ear  and ear canal normal.   Left Ear: Tympanic membrane, external ear and ear canal normal.   Eyes: EOM are normal. Pupils are equal, round, and reactive to light. No scleral icterus.   Neck: Normal range of motion. Neck supple. No thyromegaly present.   Cardiovascular: Normal rate, regular rhythm and normal heart sounds.    Pulmonary/Chest: Effort normal and breath sounds normal.   Abdominal: Soft. Bowel sounds are normal. She exhibits no mass. There is no tenderness. There is no rebound.   Musculoskeletal: Normal range of motion.   Lymphadenopathy:     She has no cervical adenopathy.   Neurological: She is alert and oriented to person, place, and time. She has normal reflexes. She displays normal reflexes. No cranial nerve deficit. She exhibits normal muscle tone. Coordination normal.   Skin: Skin is warm and dry.   Psychiatric: She has a normal mood and affect. Her behavior is normal.       Assessment:       1. URI with cough and congestion        Plan:   Ingrid was seen today for cough.    Diagnoses and all orders for this visit:    URI with cough and congestion    Other orders  -     promethazine-codeine 6.25-10 mg/5 ml (PHENERGAN WITH CODEINE) 6.25-10 mg/5 mL syrup; Take 5 mLs by mouth every 6 (six) hours as needed.

## 2018-02-21 ENCOUNTER — OFFICE VISIT (OUTPATIENT)
Dept: UROGYNECOLOGY | Facility: CLINIC | Age: 48
End: 2018-02-21
Payer: COMMERCIAL

## 2018-02-21 ENCOUNTER — TELEPHONE (OUTPATIENT)
Dept: UROGYNECOLOGY | Facility: CLINIC | Age: 48
End: 2018-02-21

## 2018-02-21 VITALS
DIASTOLIC BLOOD PRESSURE: 84 MMHG | BODY MASS INDEX: 33.2 KG/M2 | SYSTOLIC BLOOD PRESSURE: 120 MMHG | WEIGHT: 187.38 LBS | HEIGHT: 63 IN

## 2018-02-21 DIAGNOSIS — N89.8 VAGINAL DISCHARGE: ICD-10-CM

## 2018-02-21 DIAGNOSIS — Z98.890 POST-OPERATIVE STATE: Primary | ICD-10-CM

## 2018-02-21 DIAGNOSIS — R30.0 DYSURIA: ICD-10-CM

## 2018-02-21 PROBLEM — N92.0 MENORRHAGIA: Status: RESOLVED | Noted: 2018-01-08 | Resolved: 2018-02-21

## 2018-02-21 PROBLEM — N85.2 ENLARGED UTERUS: Status: RESOLVED | Noted: 2017-08-27 | Resolved: 2018-02-21

## 2018-02-21 PROBLEM — N39.3 STRESS INCONTINENCE IN FEMALE: Status: RESOLVED | Noted: 2017-08-27 | Resolved: 2018-02-21

## 2018-02-21 PROCEDURE — 87086 URINE CULTURE/COLONY COUNT: CPT

## 2018-02-21 PROCEDURE — 87480 CANDIDA DNA DIR PROBE: CPT

## 2018-02-21 PROCEDURE — 99999 PR PBB SHADOW E&M-EST. PATIENT-LVL III: CPT | Mod: PBBFAC,,, | Performed by: NURSE PRACTITIONER

## 2018-02-21 PROCEDURE — 99024 POSTOP FOLLOW-UP VISIT: CPT | Mod: S$GLB,,, | Performed by: NURSE PRACTITIONER

## 2018-02-21 RX ORDER — NAPROXEN 500 MG/1
TABLET ORAL
COMMUNITY
Start: 2018-02-10 | End: 2018-02-26

## 2018-02-21 NOTE — TELEPHONE ENCOUNTER
Informed pt per NP Francisca it's normal to have some pressure after having an exam and once her urine culture results comes in she'll be notified. Pt voiced understanding and call ended.

## 2018-02-21 NOTE — TELEPHONE ENCOUNTER
----- Message from Ishmael Nelson sent at 2/21/2018  3:37 PM CST -----  Please call pt she is having some pressure after being exam this morning [phone]

## 2018-02-21 NOTE — PROGRESS NOTES
Urogyn follow up  02/21/2018    [facility]  [address] [address]    Ingrid Gresham  [id number]  1970      Ingrid Gresham is a 47 y.o.  here for a urogyn follow up.    Date of procedure:  1/8/2018  Title of Operation:  1)  Total laparoscopic hysterectomy  2)  Laparoscopic bilateral salpingectomy  3)  Placement of transobturator tension-free midurethral sling, Obtryx II ([employer])  4)  Cystourethroscopy     Indications for Surgery:  1.UI:  (+) GAMA   (--) UUI   (--) padsDaytime frequency: Q 3-4 hours.  Nocturia: No:  (--) dysuria,  (--) hematuria,  (--) frequent UTIs.  (+) complete bladder emptying.   2. POP:  Absent.  Symptoms:(--)  .  (--) vaginal bleeding. (--) vaginal discharge. (+) sexually active.  (--) dyspareunia.   (--)  Vaginal dryness.  (--) vaginal estrogen use.   3.BM:  (+) constipation/straining.  (--) chronic diarrhea. (--) hematochezia.  (--) fecal incontinence.  (--) fecal smearing/urgency.  (+) incomplete evacuation.  Complains of menorrhagia/ metrorrhagia*     Changes from last visit:  1)  UI:  (+) GAMA  (--) UUI    (--) pads: Daytime frequency: Q 4 hours.  Nocturia: Yes: 1/night.   (--) dysuria,  (--) hematuria,  (--) frequent UTIs.  (--) complete bladder emptying.      2)  POP:  Absent. Symptoms:(+)  Vaginal itching.  (--) vaginal bleeding. (--) vaginal discharge. (+) sexually active.  (--) dyspareunia.  (+)  Vaginal dryness.  (--) vaginal estrogen use.   POP-Q:  Aa -1; Ba -1; C -5; Ap -1; Bp -1; D -8.  Genital hiatus 3, perineal body 2 total vaginal length 11.  --bony pelvis feels small with minimal cervical descensus and fibroid uterus: TLH/ uterosacral suspension  --add MUS for GAMA     3)  BM:  (--) constipation/straining.  (--) chronic diarrhea. (--) hematochezia.  (--) fecal incontinence.  (--) fecal smearing/urgency.  (--) incomplete evacuation.       [facility]  09/14/2017  1.  VOIDING PHASE:       a.   Uroflowmetry:  · Prolapse reduction: No  · Voided volume:  Negative volume recorded   · PVR:   10 mL     The overall configuration of this uroflow study was with error precluding interpretation.       b.  Pressure flow:  · Prolapse reduction: No  · Voided volume:   375 mL  · Voiding time:   51 seconds  · Peak flow:  16 mL/s   · Avg flow:  8 mL/s  · Max det pressure:  110  cm H20  · Det pressure at max flow: 41 cm H20  · Void initiated by detrusor contraction, aided by valsalva.    · Urethral relaxation (EMG):  absent.    · PVR (calculated):  0 mL     The overall configuration of this pressure flow study was normal with some valsalva assist.       2.  FILLING PHASE:  · 1st desire: 104 mL  · Normal desire:  153 mL  · Strong desire:  221 mL  · Urgency:  356 mL  · Compliance (calculated)  40+ mL/cm H20  · EMG activity during filling:   stable  · Detrusor contractions observed: No.      3.  URETHRAL FUNCTION/STORAGE PHASE:     a.  WITHOUT prolapse reduction:  · CLPP (155 mL): Negative  at  132 cm H20  · VLPP (155 mL): Negative  at  81 cm H20   · CLPP (300 mL): Negative  at  147 cm H20  · VLPP (300 mL): Negative  at  58 cm H20   · CLPP (MAX ):    Positive  at  166 cm H20  · VLPP (MAX):     Positive  at  81 cm H20     These findings are consistent with Positive urodynamic stress incontinence.     EXAM:  POPQ: C -3 to -4, D -8.  Minimal cervical/uterine descensus on standing/valsalva, narrow pelvis.      Assessment:  UF not performed.  PF normal with some valsalva assist.  Compliance normal.  Max capacity 375 mL.  DO (--).  GAMA (+).       Cysto  Findings: Urethroscopy:  Normal with mild decreased in coaptation.  Cystoscopy:  Normal bladder mucosa, bilateral ureteral flow was noted.     Assessment: Essentially normal cystourethroscopy.       08/25/2017  Pelvis ultrasound    The uterus measures 10.3 x 5.5 x 6.0cm and demonstrates several masses that are suggestive of fibroids.  These range in size from 1.6 cm to 2.6 cm in  "greatest dimension.  One mass is associated with the endometrium and is indeterminate.  It could represent a submucosal fibroid however other endometrial pathology cannot be excluded.  The endometrial thickness is 17mm. There is no endometrial fluid. The uterus is otherwise unremarkable.  The right ovary was not definitively seen.  The left ovary measures 3.6 x 2.6 x 2.5 cm and demonstrates a 2.6 cm simple cyst.  The left ovary is otherwise unremarkable..  There is intatt vascular flow to the left ovary. There is no free fluid in the visualized pelvis.  Impression:  #1. Multiple uterine masses suggestive of fibroids.  One of the masses is associated with the endometrium and it could represent a submucosal fibroid however other endometrial pathology (polyp/carcinoma) cannot be excluded.  Further evaluation is recommended.  #2.  Right ovary not seen.  This is likely secondary to bowel gas artifact.     09/2017  embx  ENDOMETRIUM: BENIGN INACTIVE ENDOMETRIAL FRAGMENTS WITH AREAS OF TUBAL METAPLASIA.     Preoperative Diagnosis:  1. Uterine leiomyoma, unspecified location      2. Midline cystocele      3. Rectocele      4. Metrorrhagia         Postoperative Diagnosis:  1. Uterine leiomyoma, unspecified location      2. Midline cystocele      3. Rectocele      4. Metrorrhagia          Past Medical History:   Diagnosis Date    GERD (gastroesophageal reflux disease)     Preeclampsia 1991    Uterine fibroid 2016       Past Surgical History:   Procedure Laterality Date    HYSTERECTOMY      SINUS SURGERY  1981    TUBAL LIGATION      "1 tube tied, 1 tube burned"     History since last visit: Rare lower abdominal cramping.  Bladder issues: Denies UI. Has occasional dysuria.   Bowel issues: Denies constipation or straining.    Current Outpatient Prescriptions   Medication Sig    diclofenac (VOLTAREN) 75 MG EC tablet Take 1 tablet (75 mg total) by mouth 2 (two) times daily.    docusate sodium (COLACE) 100 MG capsule Take 1 " "capsule (100 mg total) by mouth 2 (two) times daily.    fluticasone (FLONASE) 50 mcg/actuation nasal spray 1 spray by Each Nare route once daily.    naproxen (EC NAPROSYN) 500 MG EC tablet     promethazine (PHENERGAN) 25 MG tablet TAKE 1 TABLET(S) EVERY 6 HOURS BY ORAL ROUTE AS NEEDED.     No current facility-administered medications for this visit.          ROS:  As per HPI.      Exam  /84 (BP Location: Right arm, Patient Position: Sitting)   Ht 5' 3" (1.6 m)   Wt 85 kg (187 lb 6.3 oz)   LMP 02/08/2018 (Exact Date)   BMI 33.19 kg/m²   General: alert and oriented, no acute distress  Respiratory: normal respiratory effort  Abd: soft, non-tender, non-distended  Incisons healing well    Pelvic  Ext. Genitalia: normal external genitalia. Normal bartholin's and skeens glands  Vagina: -- atrophy. Normal vaginal mucosa without lesions. Thin, yellow, frothy discharge noted.  Cuff still healing.  No sutures noted.  No mesh visible/ palpable.  Sling path nontender.  Non-tender bladder base without palpable mass.  Cervix: absent  Uterus:  absent   Urethra: no masses or tenderness  Urethral meatus: no lesions, caruncle or prolapse.    Dr. Bridges present during exam    Impression  1. Post-operative state     2. Vaginal discharge  VAGINOSIS SCREEN BY DNA PROBE   3. Dysuria  CULTURE, URINE     We reviewed the above issues and discussed options for short-term versus long-term management of her problems.   Plan:   1. Post op still healing.  Nothing in the vagina x 1 month.  No lifting > 10 pounds x 1 month.  2. Vaginal discharge--affirm today  3. Dysuria--urine culture today  4. Abdominal pressure-- will consider pelvic floor PT  5. She will follow up with us in 4 weeks.  30 minutes were spent in face to face time with this patient  75 % of this time was spent in counseling and/or coordination of care     Vianney Espinosa NP  [facility]  Division of Female Pelvic Medicine and Reconstructive " Surgery  Department of Obstetrics & Gynecology

## 2018-02-21 NOTE — PATIENT INSTRUCTIONS
1. Post op still healing.  Nothing in the vagina x 1 month.  No lifting > 10 pounds x 1 month.  2. Vaginal discharge--affirm today  3. Dysuria--urine culture today  4. Abdominal pressure-- will consider pelvic floor PT  5. She will follow up with us in 4 weeks.

## 2018-02-22 LAB — BACTERIA UR CULT: NO GROWTH

## 2018-02-23 ENCOUNTER — TELEPHONE (OUTPATIENT)
Dept: UROGYNECOLOGY | Facility: CLINIC | Age: 48
End: 2018-02-23

## 2018-02-23 ENCOUNTER — PATIENT MESSAGE (OUTPATIENT)
Dept: UROGYNECOLOGY | Facility: CLINIC | Age: 48
End: 2018-02-23

## 2018-02-23 NOTE — TELEPHONE ENCOUNTER
Called and spoke with pt regarding some pelvic pain that she had been experiencing post vaginal exam, pt denied blood, fever or any other symptoms. Per NP Francisca who had spoke with pt on yesterday pt is to take ibuprofen 400 mg every 6 hours as needed keep follow up appointment and go to ER with any changes in symptoms, pt voiced understanding and call was ended.

## 2018-02-23 NOTE — TELEPHONE ENCOUNTER
----- Message from Moira Flores sent at 2/23/2018 11:58 AM CST -----  Contact: self  _x  1st Request  _  2nd Request  _  3rd Request    Who: pt    Why: pt is experiencing pelvic pain.. Please advise    What Number to Call Back: [phone]    When to Expect a call back: (Before the end of the day)   -- if call after 3:00 call back will be tomorrow.

## 2018-02-26 DIAGNOSIS — R07.9 CHEST PAIN, UNSPECIFIED TYPE: Primary | ICD-10-CM

## 2018-02-26 RX ORDER — NAPROXEN 500 MG/1
TABLET ORAL
OUTPATIENT
Start: 2018-02-26

## 2018-02-26 RX ORDER — IBUPROFEN 600 MG/1
600 TABLET ORAL EVERY 6 HOURS PRN
Qty: 30 TABLET | Refills: 1 | Status: SHIPPED | OUTPATIENT
Start: 2018-02-26 | End: 2018-07-17

## 2018-02-26 RX ORDER — IBUPROFEN 400 MG/1
400 TABLET ORAL EVERY 6 HOURS
Qty: 30 TABLET | Refills: 1 | OUTPATIENT
Start: 2018-02-26

## 2018-02-26 NOTE — TELEPHONE ENCOUNTER
----- Message from Vianney Espinosa NP sent at 2/26/2018 10:49 AM CST -----  Please call and ask patient if she is taking voltaren and naprosyn.  If she is, I can not give her ibuprofen too.    Thanks,  Vianney

## 2018-02-26 NOTE — TELEPHONE ENCOUNTER
Pt stated she's only taking the naprosyn as needed for pain but it's not working well and would like the ibuprofen instead. Informed pt I'll relay this message to BEN Espinosa. Pt voiced understanding and call ended.

## 2018-02-27 NOTE — TELEPHONE ENCOUNTER
----- Message from Bettye Ley sent at 2/27/2018  9:16 AM CST -----  Contact: self  Pt states she is still in a lot of pain, when she walks, it feels like she is pulling a muscle, she can be reached at [phone].

## 2018-02-28 ENCOUNTER — TELEPHONE (OUTPATIENT)
Dept: UROGYNECOLOGY | Facility: CLINIC | Age: 48
End: 2018-02-28

## 2018-02-28 NOTE — TELEPHONE ENCOUNTER
----- Message from Ishmael Nelson sent at 2/28/2018  9:34 AM CST -----  PT RETURNING YOUR CALL [phone]

## 2018-03-21 ENCOUNTER — OFFICE VISIT (OUTPATIENT)
Dept: UROGYNECOLOGY | Facility: CLINIC | Age: 48
End: 2018-03-21
Payer: COMMERCIAL

## 2018-03-21 VITALS
DIASTOLIC BLOOD PRESSURE: 80 MMHG | HEIGHT: 63 IN | WEIGHT: 189.81 LBS | BODY MASS INDEX: 33.63 KG/M2 | SYSTOLIC BLOOD PRESSURE: 124 MMHG

## 2018-03-21 DIAGNOSIS — Z98.890 POST-OPERATIVE STATE: Primary | ICD-10-CM

## 2018-03-21 DIAGNOSIS — N39.8 VOIDING DYSFUNCTION: ICD-10-CM

## 2018-03-21 PROCEDURE — 99999 PR PBB SHADOW E&M-EST. PATIENT-LVL III: CPT | Mod: PBBFAC,,, | Performed by: NURSE PRACTITIONER

## 2018-03-21 PROCEDURE — 99024 POSTOP FOLLOW-UP VISIT: CPT | Mod: S$GLB,,, | Performed by: NURSE PRACTITIONER

## 2018-03-21 NOTE — PROGRESS NOTES
Urogyn follow up  03/21/2018    [facility]  [address] [address]    Ingrid Gresham  [id number]  1970      Ingrid Gresham is a 47 y.o.  here for a urogyn follow up.    Date of procedure:  1/8/2018  Title of Operation:  1)  Total laparoscopic hysterectomy  2)  Laparoscopic bilateral salpingectomy  3)  Placement of transobturator tension-free midurethral sling, Obtryx II ([employer])  4)  Cystourethroscopy     Indications for Surgery:  1.UI:  (+) GAMA   (--) UUI   (--) padsDaytime frequency: Q 3-4 hours.  Nocturia: No:  (--) dysuria,  (--) hematuria,  (--) frequent UTIs.  (+) complete bladder emptying.   2. POP:  Absent.  Symptoms:(--)  .  (--) vaginal bleeding. (--) vaginal discharge. (+) sexually active.  (--) dyspareunia.   (--)  Vaginal dryness.  (--) vaginal estrogen use.   3.BM:  (+) constipation/straining.  (--) chronic diarrhea. (--) hematochezia.  (--) fecal incontinence.  (--) fecal smearing/urgency.  (+) incomplete evacuation.  Complains of menorrhagia/ metrorrhagia*     Changes from last visit:  1)  UI:  (+) GAMA  (--) UUI    (--) pads: Daytime frequency: Q 4 hours.  Nocturia: Yes: 1/night.   (--) dysuria,  (--) hematuria,  (--) frequent UTIs.  (--) complete bladder emptying.      2)  POP:  Absent. Symptoms:(+)  Vaginal itching.  (--) vaginal bleeding. (--) vaginal discharge. (+) sexually active.  (--) dyspareunia.  (+)  Vaginal dryness.  (--) vaginal estrogen use.   POP-Q:  Aa -1; Ba -1; C -5; Ap -1; Bp -1; D -8.  Genital hiatus 3, perineal body 2 total vaginal length 11.  --bony pelvis feels small with minimal cervical descensus and fibroid uterus: TLH/ uterosacral suspension  --add MUS for GAMA     3)  BM:  (--) constipation/straining.  (--) chronic diarrhea. (--) hematochezia.  (--) fecal incontinence.  (--) fecal smearing/urgency.  (--) incomplete evacuation.       [facility]  09/14/2017  1.  VOIDING PHASE:       a.   Uroflowmetry:  · Prolapse reduction: No  · Voided volume:  Negative volume recorded   · PVR:   10 mL     The overall configuration of this uroflow study was with error precluding interpretation.       b.  Pressure flow:  · Prolapse reduction: No  · Voided volume:   375 mL  · Voiding time:   51 seconds  · Peak flow:  16 mL/s   · Avg flow:  8 mL/s  · Max det pressure:  110  cm H20  · Det pressure at max flow: 41 cm H20  · Void initiated by detrusor contraction, aided by valsalva.    · Urethral relaxation (EMG):  absent.    · PVR (calculated):  0 mL     The overall configuration of this pressure flow study was normal with some valsalva assist.       2.  FILLING PHASE:  · 1st desire: 104 mL  · Normal desire:  153 mL  · Strong desire:  221 mL  · Urgency:  356 mL  · Compliance (calculated)  40+ mL/cm H20  · EMG activity during filling:   stable  · Detrusor contractions observed: No.      3.  URETHRAL FUNCTION/STORAGE PHASE:     a.  WITHOUT prolapse reduction:  · CLPP (155 mL): Negative  at  132 cm H20  · VLPP (155 mL): Negative  at  81 cm H20   · CLPP (300 mL): Negative  at  147 cm H20  · VLPP (300 mL): Negative  at  58 cm H20   · CLPP (MAX ):    Positive  at  166 cm H20  · VLPP (MAX):     Positive  at  81 cm H20     These findings are consistent with Positive urodynamic stress incontinence.     EXAM:  POPQ: C -3 to -4, D -8.  Minimal cervical/uterine descensus on standing/valsalva, narrow pelvis.      Assessment:  UF not performed.  PF normal with some valsalva assist.  Compliance normal.  Max capacity 375 mL.  DO (--).  GAMA (+).       Cysto  Findings: Urethroscopy:  Normal with mild decreased in coaptation.  Cystoscopy:  Normal bladder mucosa, bilateral ureteral flow was noted.     Assessment: Essentially normal cystourethroscopy.       08/25/2017  Pelvis ultrasound    The uterus measures 10.3 x 5.5 x 6.0cm and demonstrates several masses that are suggestive of fibroids.  These range in size from 1.6 cm to 2.6 cm in  "greatest dimension.  One mass is associated with the endometrium and is indeterminate.  It could represent a submucosal fibroid however other endometrial pathology cannot be excluded.  The endometrial thickness is 17mm. There is no endometrial fluid. The uterus is otherwise unremarkable.  The right ovary was not definitively seen.  The left ovary measures 3.6 x 2.6 x 2.5 cm and demonstrates a 2.6 cm simple cyst.  The left ovary is otherwise unremarkable..  There is intatt vascular flow to the left ovary. There is no free fluid in the visualized pelvis.  Impression:  #1. Multiple uterine masses suggestive of fibroids.  One of the masses is associated with the endometrium and it could represent a submucosal fibroid however other endometrial pathology (polyp/carcinoma) cannot be excluded.  Further evaluation is recommended.  #2.  Right ovary not seen.  This is likely secondary to bowel gas artifact.     09/2017  embx  ENDOMETRIUM: BENIGN INACTIVE ENDOMETRIAL FRAGMENTS WITH AREAS OF TUBAL METAPLASIA.     Preoperative Diagnosis:  1. Uterine leiomyoma, unspecified location      2. Midline cystocele      3. Rectocele      4. Metrorrhagia         Postoperative Diagnosis:  1. Uterine leiomyoma, unspecified location      2. Midline cystocele      3. Rectocele      4. Metrorrhagia          Past Medical History:   Diagnosis Date    GERD (gastroesophageal reflux disease)     Preeclampsia 1991    Uterine fibroid 2016       Past Surgical History:   Procedure Laterality Date    HYSTERECTOMY      SINUS SURGERY  1981    TUBAL LIGATION      "1 tube tied, 1 tube burned"     History since last visit: Rare lower abdominal cramping--improved from last visit.  Bladder issues: Denies UI. Occasionally has urinary hesitancy. Some voiding dysfunction (urinary urgency). Does have a weird sensation in vagina when she coughs sometimes.   Bowel issues: Denies constipation or straining.  Having some NS, stable from preop.     Current " "Outpatient Prescriptions   Medication Sig    diclofenac (VOLTAREN) 75 MG EC tablet Take 1 tablet (75 mg total) by mouth 2 (two) times daily.    docusate sodium (COLACE) 100 MG capsule Take 1 capsule (100 mg total) by mouth 2 (two) times daily.    ibuprofen (ADVIL,MOTRIN) 600 MG tablet Take 1 tablet (600 mg total) by mouth every 6 (six) hours as needed for Pain.    fluticasone (FLONASE) 50 mcg/actuation nasal spray 1 spray by Each Nare route once daily.    promethazine (PHENERGAN) 25 MG tablet TAKE 1 TABLET(S) EVERY 6 HOURS BY ORAL ROUTE AS NEEDED.     No current facility-administered medications for this visit.          ROS:  As per HPI.      Exam  /80 (BP Location: Right arm, Patient Position: Sitting, BP Method: Large (Manual))   Ht 5' 3" (1.6 m)   Wt 86.1 kg (189 lb 13.1 oz)   LMP 02/08/2018 (Exact Date)   BMI 33.62 kg/m²   General: alert and oriented, no acute distress  Respiratory: normal respiratory effort  Abd: soft, non-tender, non-distended  Incisons healing well    Pelvic  Ext. Genitalia: normal external genitalia. Normal bartholin's and skeens glands  Vagina: -- atrophy. Normal vaginal mucosa without lesions. No d/c.  Cuff well-healed.  No sutures noted.  No mesh visible/ palpable.  Sling path nontender.  Non-tender bladder base without palpable mass.  Cervix: absent  Uterus:  absent   Urethra: no masses or tenderness  Urethral meatus: no lesions, caruncle or prolapse.    Dr. Bridges present during exam    Impression  1. Post-operative state     2. Voiding dysfunction       We reviewed the above issues and discussed options for short-term versus long-term management of her problems.   Plan:   1. Post op healed well.  Resume normal activity.  Always get help lifting 50 lbs or more.   2. ?voiding dysfunction/vaginal sensation:  Consider PT if still occurring at next visit.   3. She will follow up with us in 7/18 for 6 month check.        30 minutes were spent in face to face time with this " patient  75 % of this time was spent in counseling and/or coordination of care     Vianney Espinosa NP  [facility]  Division of Female Pelvic Medicine and Reconstructive Surgery  Department of Obstetrics & Gynecology

## 2018-03-21 NOTE — PATIENT INSTRUCTIONS
1. Post op healed well.  Resume normal activity.  Always get help lifting 50 lbs or more.   2. ?voiding dysfunction/vaginal sensation:  Consider PT if still occurring at next visit.   3. She will follow up with us in 7/18 for 6 month check.

## 2018-05-08 RX ORDER — DICLOFENAC SODIUM 75 MG/1
75 TABLET, DELAYED RELEASE ORAL 2 TIMES DAILY
Qty: 30 TABLET | Refills: 0 | Status: SHIPPED | OUTPATIENT
Start: 2018-05-08 | End: 2018-06-14 | Stop reason: SDUPTHER

## 2018-05-14 ENCOUNTER — PATIENT MESSAGE (OUTPATIENT)
Dept: UROGYNECOLOGY | Facility: CLINIC | Age: 48
End: 2018-05-14

## 2018-05-31 ENCOUNTER — PATIENT MESSAGE (OUTPATIENT)
Dept: UROGYNECOLOGY | Facility: CLINIC | Age: 48
End: 2018-05-31

## 2018-06-14 RX ORDER — DICLOFENAC SODIUM 75 MG/1
75 TABLET, DELAYED RELEASE ORAL 2 TIMES DAILY
Qty: 30 TABLET | Refills: 0 | Status: SHIPPED | OUTPATIENT
Start: 2018-06-14 | End: 2018-07-17 | Stop reason: SDUPTHER

## 2018-06-27 RX ORDER — METHOCARBAMOL 500 MG/1
500 TABLET, FILM COATED ORAL 4 TIMES DAILY
Qty: 40 TABLET | Refills: 0 | Status: SHIPPED | OUTPATIENT
Start: 2018-06-27 | End: 2018-07-07

## 2018-07-05 ENCOUNTER — TELEPHONE (OUTPATIENT)
Dept: INTERNAL MEDICINE | Facility: CLINIC | Age: 48
End: 2018-07-05

## 2018-07-05 NOTE — TELEPHONE ENCOUNTER
Informed pt that Dari NP can not be PCP, number provided for pt to maker [facility] appt. Pt inquired about appts at [facility].

## 2018-07-05 NOTE — TELEPHONE ENCOUNTER
----- Message from Radha Garcia sent at 7/5/2018 11:59 AM CDT -----  Contact: PT Portal Request  Appointment Request From: Ingrid Gresham    With Provider: Dari Mendoza NP [-Primary Care Physician-]    Would Accept With:Request appointment time not available    Preferred Date Range: Any date 7/18/2018 or later    Preferred Times: Any    Reason for visit: Request an Appt    Comments:  Good morning:    I need to schedule an appointment with a primary Doctor, annual visit, my provider does not have anything available on that day, also if is possible to see someone @ [facility] on the 18th of July, around 10:30am or later.    Thank you

## 2018-07-17 ENCOUNTER — OFFICE VISIT (OUTPATIENT)
Dept: INTERNAL MEDICINE | Facility: CLINIC | Age: 48
End: 2018-07-17
Payer: COMMERCIAL

## 2018-07-17 ENCOUNTER — NURSE TRIAGE (OUTPATIENT)
Dept: ADMINISTRATIVE | Facility: CLINIC | Age: 48
End: 2018-07-17

## 2018-07-17 ENCOUNTER — LAB VISIT (OUTPATIENT)
Dept: LAB | Facility: HOSPITAL | Age: 48
End: 2018-07-17
Attending: INTERNAL MEDICINE
Payer: COMMERCIAL

## 2018-07-17 VITALS
RESPIRATION RATE: 16 BRPM | WEIGHT: 191.81 LBS | TEMPERATURE: 99 F | BODY MASS INDEX: 33.98 KG/M2 | SYSTOLIC BLOOD PRESSURE: 124 MMHG | DIASTOLIC BLOOD PRESSURE: 80 MMHG | HEIGHT: 63 IN | HEART RATE: 68 BPM

## 2018-07-17 DIAGNOSIS — F41.9 ANXIETY: ICD-10-CM

## 2018-07-17 DIAGNOSIS — M19.90 ARTHRITIS: ICD-10-CM

## 2018-07-17 DIAGNOSIS — Z00.00 ANNUAL PHYSICAL EXAM: Primary | ICD-10-CM

## 2018-07-17 DIAGNOSIS — E66.9 OBESITY (BMI 30-39.9): ICD-10-CM

## 2018-07-17 DIAGNOSIS — Z12.31 ENCOUNTER FOR SCREENING MAMMOGRAM FOR BREAST CANCER: ICD-10-CM

## 2018-07-17 DIAGNOSIS — G43.809 OTHER MIGRAINE WITHOUT STATUS MIGRAINOSUS, NOT INTRACTABLE: ICD-10-CM

## 2018-07-17 DIAGNOSIS — Z00.00 ANNUAL PHYSICAL EXAM: ICD-10-CM

## 2018-07-17 PROBLEM — G43.909 MIGRAINE WITHOUT STATUS MIGRAINOSUS, NOT INTRACTABLE: Status: ACTIVE | Noted: 2018-07-17

## 2018-07-17 LAB
ALBUMIN SERPL BCP-MCNC: 4.1 G/DL
ALP SERPL-CCNC: 137 U/L
ALT SERPL W/O P-5'-P-CCNC: 78 U/L
ANION GAP SERPL CALC-SCNC: 9 MMOL/L
AST SERPL-CCNC: 42 U/L
BASOPHILS # BLD AUTO: 0.08 K/UL
BASOPHILS NFR BLD: 0.7 %
BILIRUB SERPL-MCNC: 0.3 MG/DL
BUN SERPL-MCNC: 13 MG/DL
CALCIUM SERPL-MCNC: 9.5 MG/DL
CHLORIDE SERPL-SCNC: 105 MMOL/L
CO2 SERPL-SCNC: 24 MMOL/L
CREAT SERPL-MCNC: 1 MG/DL
DIFFERENTIAL METHOD: NORMAL
EOSINOPHIL # BLD AUTO: 0.2 K/UL
EOSINOPHIL NFR BLD: 2.2 %
ERYTHROCYTE [DISTWIDTH] IN BLOOD BY AUTOMATED COUNT: 14.5 %
EST. GFR  (AFRICAN AMERICAN): >60 ML/MIN/1.73 M^2
EST. GFR  (NON AFRICAN AMERICAN): >60 ML/MIN/1.73 M^2
GLUCOSE SERPL-MCNC: 87 MG/DL
HCT VFR BLD AUTO: 45.6 %
HGB BLD-MCNC: 14.9 G/DL
IMM GRANULOCYTES # BLD AUTO: 0.04 K/UL
IMM GRANULOCYTES NFR BLD AUTO: 0.4 %
LYMPHOCYTES # BLD AUTO: 2.4 K/UL
LYMPHOCYTES NFR BLD: 22.1 %
MCH RBC QN AUTO: 28.3 PG
MCHC RBC AUTO-ENTMCNC: 32.7 G/DL
MCV RBC AUTO: 87 FL
MONOCYTES # BLD AUTO: 0.8 K/UL
MONOCYTES NFR BLD: 7.5 %
NEUTROPHILS # BLD AUTO: 7.2 K/UL
NEUTROPHILS NFR BLD: 67.1 %
NRBC BLD-RTO: 0 /100 WBC
PLATELET # BLD AUTO: 321 K/UL
PMV BLD AUTO: 11.3 FL
POTASSIUM SERPL-SCNC: 4.1 MMOL/L
PROT SERPL-MCNC: 8.1 G/DL
RBC # BLD AUTO: 5.26 M/UL
SODIUM SERPL-SCNC: 138 MMOL/L
TSH SERPL DL<=0.005 MIU/L-ACNC: 3.18 UIU/ML
WBC # BLD AUTO: 10.67 K/UL

## 2018-07-17 PROCEDURE — 99999 PR PBB SHADOW E&M-EST. PATIENT-LVL III: CPT | Mod: PBBFAC,,, | Performed by: INTERNAL MEDICINE

## 2018-07-17 PROCEDURE — 99396 PREV VISIT EST AGE 40-64: CPT | Mod: S$GLB,,, | Performed by: INTERNAL MEDICINE

## 2018-07-17 PROCEDURE — 36415 COLL VENOUS BLD VENIPUNCTURE: CPT | Mod: PO

## 2018-07-17 PROCEDURE — 85025 COMPLETE CBC W/AUTO DIFF WBC: CPT

## 2018-07-17 PROCEDURE — 80053 COMPREHEN METABOLIC PANEL: CPT

## 2018-07-17 PROCEDURE — 84443 ASSAY THYROID STIM HORMONE: CPT

## 2018-07-17 RX ORDER — DICLOFENAC SODIUM 75 MG/1
75 TABLET, DELAYED RELEASE ORAL 2 TIMES DAILY
Qty: 60 TABLET | Refills: 1 | Status: SHIPPED | OUTPATIENT
Start: 2018-07-17

## 2018-07-17 RX ORDER — BUTALBITAL, ACETAMINOPHEN AND CAFFEINE 50; 325; 40 MG/1; MG/1; MG/1
1 TABLET ORAL EVERY 4 HOURS PRN
COMMUNITY

## 2018-07-17 RX ORDER — CETIRIZINE HYDROCHLORIDE 10 MG/1
10 TABLET ORAL DAILY
Refills: 12 | COMMUNITY
Start: 2018-05-22

## 2018-07-17 RX ORDER — ESCITALOPRAM OXALATE 10 MG/1
10 TABLET ORAL DAILY
Refills: 0 | COMMUNITY
Start: 2018-06-02 | End: 2018-07-17

## 2018-07-17 RX ORDER — CITALOPRAM 20 MG/1
20 TABLET, FILM COATED ORAL DAILY
Qty: 30 TABLET | Refills: 5 | Status: SHIPPED | OUTPATIENT
Start: 2018-07-17 | End: 2018-08-16

## 2018-07-17 NOTE — PROGRESS NOTES
"Subjective:       Patient ID: Ingrid Gresham is a 48 y.o. female.    Chief Complaint: Annual Exam (new patient.  0 pain.)    HPI   48 y.o. Female here for annual exam.     Cholesterol: (normal)  Vaccines: Influenza (2017); Tetanus (2017)  Eye exam: 2018  Mammogram: 8/17  Gyn exam: 7/18    Exercise: no  Diet: regular    Past Medical History:  No date: Anxiety  No date: Depression  No date: GERD (gastroesophageal reflux disease)  1991: Preeclampsia  2016: Uterine fibroid  Past Surgical History:  No date: HYSTERECTOMY  1981: SINUS SURGERY  No date: TUBAL LIGATION      Comment: "1 tube tied, 1 tube burned"  Social History    Marital status: Single              Spouse name:                       Years of education:                 Number of children: 2             Occupational History  Occupation          Employer            Comment               Schedule coordinat*                         Social History Main Topics    Smoking status: Former Smoker                                                                Packs/day: 0.00      Years: 0.00           Quit date: 3/19/2017    Smokeless tobacco: Former User                       Comment: former social smoker    Alcohol use: Yes                Comment: rarely    Drug use: No              Sexual activity: Yes               Partners with: Male       Birth control/protection: See Surgical Hx       Comment: BTL    Review of patient's allergies indicates:  No Known Allergies  Ms. Gresham had no medications administered during this visit.    Review of Systems   Constitutional: Negative for activity change, appetite change, chills, diaphoresis, fatigue, fever and unexpected weight change.   HENT: Negative for congestion, mouth sores, postnasal drip, rhinorrhea, sinus pressure, sneezing, sore throat, trouble swallowing and voice change.    Eyes: Negative for pain, discharge and visual disturbance.   Respiratory: Negative for cough, shortness of breath and wheezing.  "   Cardiovascular: Negative for chest pain, palpitations and leg swelling.   Gastrointestinal: Negative for abdominal pain, blood in stool, constipation, diarrhea, nausea and vomiting.   Endocrine: Negative for cold intolerance and heat intolerance.   Genitourinary: Negative for difficulty urinating, dysuria, frequency, hematuria and urgency.   Musculoskeletal: Positive for arthralgias. Negative for myalgias.   Skin: Negative for rash and wound.   Allergic/Immunologic: Negative for environmental allergies and food allergies.   Neurological: Positive for headaches. Negative for dizziness, tremors, seizures, syncope, weakness and light-headedness.   Hematological: Negative for adenopathy. Does not bruise/bleed easily.   Psychiatric/Behavioral: Negative for confusion and sleep disturbance. The patient is nervous/anxious.        Objective:      Physical Exam   Constitutional: She is oriented to person, place, and time. She appears well-developed and well-nourished. No distress.   HENT:   Head: Normocephalic and atraumatic.   Right Ear: External ear normal.   Left Ear: External ear normal.   Nose: Nose normal.   Mouth/Throat: Oropharynx is clear and moist. No oropharyngeal exudate.   Eyes: Conjunctivae and EOM are normal. Pupils are equal, round, and reactive to light. Right eye exhibits no discharge. Left eye exhibits no discharge. No scleral icterus.   Neck: Neck supple. No JVD present. No thyromegaly present.   Cardiovascular: Normal rate, regular rhythm, normal heart sounds and intact distal pulses.    No murmur heard.  Pulmonary/Chest: Effort normal and breath sounds normal. No respiratory distress. She has no wheezes. She has no rales. She exhibits no tenderness.   Abdominal: Soft. Bowel sounds are normal. She exhibits no distension. There is no tenderness. There is no guarding.   Musculoskeletal: She exhibits no edema.   Lymphadenopathy:     She has no cervical adenopathy.   Neurological: She is alert and oriented  to person, place, and time. No cranial nerve deficit. Coordination normal.   Skin: Skin is warm and dry. No rash noted. She is not diaphoretic. No pallor.   Psychiatric: She has a normal mood and affect. Judgment normal.   Nursing note and vitals reviewed.      Assessment:       1. Annual physical exam    2. Obesity (BMI 30-39.9)    3. Other migraine without status migrainosus, not intractable    4. Arthritis    5. Anxiety    6. Encounter for screening mammogram for breast cancer        Plan:    1. Await blood work done at outside lab        Vaccines: Influenza (2017); Tetanus (2017)       Eye exam: 2018       Mammogram: 8/17       Gyn exam: 7/18   2. Obesity- pt advised on proper diet/exercise for weight loss   3. Migraines- controlled on Fioricet PRN    4. Arthritis- stable on Diclofenac PRN   5. Anxiety- Rx Celexa 20 mg daily    6. Mammo ordered

## 2018-07-18 ENCOUNTER — OFFICE VISIT (OUTPATIENT)
Dept: UROGYNECOLOGY | Facility: CLINIC | Age: 48
End: 2018-07-18
Payer: COMMERCIAL

## 2018-07-18 VITALS
BODY MASS INDEX: 34.42 KG/M2 | DIASTOLIC BLOOD PRESSURE: 70 MMHG | WEIGHT: 194.25 LBS | HEIGHT: 63 IN | SYSTOLIC BLOOD PRESSURE: 122 MMHG

## 2018-07-18 DIAGNOSIS — N95.2 VAGINAL ATROPHY: ICD-10-CM

## 2018-07-18 DIAGNOSIS — N95.1 MENOPAUSAL SYMPTOMS: Primary | ICD-10-CM

## 2018-07-18 PROCEDURE — 3008F BODY MASS INDEX DOCD: CPT | Mod: CPTII,S$GLB,, | Performed by: NURSE PRACTITIONER

## 2018-07-18 PROCEDURE — 99214 OFFICE O/P EST MOD 30 MIN: CPT | Mod: S$GLB,,, | Performed by: NURSE PRACTITIONER

## 2018-07-18 PROCEDURE — 99999 PR PBB SHADOW E&M-EST. PATIENT-LVL III: CPT | Mod: PBBFAC,,, | Performed by: NURSE PRACTITIONER

## 2018-07-18 RX ORDER — AMITRIPTYLINE HYDROCHLORIDE 10 MG/1
TABLET, FILM COATED ORAL
Qty: 90 TABLET | Refills: 0 | Status: SHIPPED | OUTPATIENT
Start: 2018-07-18

## 2018-07-18 RX ORDER — ESTRADIOL 0.1 MG/G
CREAM VAGINAL
Qty: 42.5 G | Refills: 3 | Status: SHIPPED | OUTPATIENT
Start: 2018-07-18

## 2018-07-18 NOTE — PROGRESS NOTES
Urogyn follow up  07/18/2018    [facility]  [address] [address]    Ingrid Gresham  [id number]  1970      Ingrid Gresham is a 48 y.o.  here for a urogyn follow up.    Date of procedure:  1/8/2018  Title of Operation:  1)  Total laparoscopic hysterectomy  2)  Laparoscopic bilateral salpingectomy  3)  Placement of transobturator tension-free midurethral sling, Obtryx II ([employer])  4)  Cystourethroscopy     Indications for Surgery:  1.UI:  (+) GAMA   (--) UUI   (--) padsDaytime frequency: Q 3-4 hours.  Nocturia: No:  (--) dysuria,  (--) hematuria,  (--) frequent UTIs.  (+) complete bladder emptying.   2. POP:  Absent.  Symptoms:(--)  .  (--) vaginal bleeding. (--) vaginal discharge. (+) sexually active.  (--) dyspareunia.   (--)  Vaginal dryness.  (--) vaginal estrogen use.   3.BM:  (+) constipation/straining.  (--) chronic diarrhea. (--) hematochezia.  (--) fecal incontinence.  (--) fecal smearing/urgency.  (+) incomplete evacuation.  Complains of menorrhagia/ metrorrhagia*     Changes from last visit:  1)  UI:  (+) GAMA  (--) UUI    (--) pads: Daytime frequency: Q 4 hours.  Nocturia: Yes: 1/night.   (--) dysuria,  (--) hematuria,  (--) frequent UTIs.  (--) complete bladder emptying.      2)  POP:  Absent. Symptoms:(+)  Vaginal itching.  (--) vaginal bleeding. (--) vaginal discharge. (+) sexually active.  (--) dyspareunia.  (+)  Vaginal dryness.  (--) vaginal estrogen use.   POP-Q:  Aa -1; Ba -1; C -5; Ap -1; Bp -1; D -8.  Genital hiatus 3, perineal body 2 total vaginal length 11.  --bony pelvis feels small with minimal cervical descensus and fibroid uterus: TLH/ uterosacral suspension  --add MUS for GAMA     3)  BM:  (--) constipation/straining.  (--) chronic diarrhea. (--) hematochezia.  (--) fecal incontinence.  (--) fecal smearing/urgency.  (--) incomplete evacuation.       [facility]  09/14/2017  1.  VOIDING PHASE:       a.   Uroflowmetry:  · Prolapse reduction: No  · Voided volume:  Negative volume recorded   · PVR:   10 mL     The overall configuration of this uroflow study was with error precluding interpretation.       b.  Pressure flow:  · Prolapse reduction: No  · Voided volume:   375 mL  · Voiding time:   51 seconds  · Peak flow:  16 mL/s   · Avg flow:  8 mL/s  · Max det pressure:  110  cm H20  · Det pressure at max flow: 41 cm H20  · Void initiated by detrusor contraction, aided by valsalva.    · Urethral relaxation (EMG):  absent.    · PVR (calculated):  0 mL     The overall configuration of this pressure flow study was normal with some valsalva assist.       2.  FILLING PHASE:  · 1st desire: 104 mL  · Normal desire:  153 mL  · Strong desire:  221 mL  · Urgency:  356 mL  · Compliance (calculated)  40+ mL/cm H20  · EMG activity during filling:   stable  · Detrusor contractions observed: No.      3.  URETHRAL FUNCTION/STORAGE PHASE:     a.  WITHOUT prolapse reduction:  · CLPP (155 mL): Negative  at  132 cm H20  · VLPP (155 mL): Negative  at  81 cm H20   · CLPP (300 mL): Negative  at  147 cm H20  · VLPP (300 mL): Negative  at  58 cm H20   · CLPP (MAX ):    Positive  at  166 cm H20  · VLPP (MAX):     Positive  at  81 cm H20     These findings are consistent with Positive urodynamic stress incontinence.     EXAM:  POPQ: C -3 to -4, D -8.  Minimal cervical/uterine descensus on standing/valsalva, narrow pelvis.      Assessment:  UF not performed.  PF normal with some valsalva assist.  Compliance normal.  Max capacity 375 mL.  DO (--).  GAMA (+).       Cysto  Findings: Urethroscopy:  Normal with mild decreased in coaptation.  Cystoscopy:  Normal bladder mucosa, bilateral ureteral flow was noted.     Assessment: Essentially normal cystourethroscopy.       08/25/2017  Pelvis ultrasound    The uterus measures 10.3 x 5.5 x 6.0cm and demonstrates several masses that are suggestive of fibroids.  These range in size from 1.6 cm to 2.6 cm in  "greatest dimension.  One mass is associated with the endometrium and is indeterminate.  It could represent a submucosal fibroid however other endometrial pathology cannot be excluded.  The endometrial thickness is 17mm. There is no endometrial fluid. The uterus is otherwise unremarkable.  The right ovary was not definitively seen.  The left ovary measures 3.6 x 2.6 x 2.5 cm and demonstrates a 2.6 cm simple cyst.  The left ovary is otherwise unremarkable..  There is intatt vascular flow to the left ovary. There is no free fluid in the visualized pelvis.  Impression:  #1. Multiple uterine masses suggestive of fibroids.  One of the masses is associated with the endometrium and it could represent a submucosal fibroid however other endometrial pathology (polyp/carcinoma) cannot be excluded.  Further evaluation is recommended.  #2.  Right ovary not seen.  This is likely secondary to bowel gas artifact.     09/2017  embx  ENDOMETRIUM: BENIGN INACTIVE ENDOMETRIAL FRAGMENTS WITH AREAS OF TUBAL METAPLASIA.     Preoperative Diagnosis:  1. Uterine leiomyoma, unspecified location      2. Midline cystocele      3. Rectocele      4. Metrorrhagia         Postoperative Diagnosis:  1. Uterine leiomyoma, unspecified location      2. Midline cystocele      3. Rectocele      4. Metrorrhagia          Past Medical History:   Diagnosis Date    Anxiety     Depression     GERD (gastroesophageal reflux disease)     Preeclampsia 1991    Uterine fibroid 2016       Past Surgical History:   Procedure Laterality Date    HYSTERECTOMY      SINUS SURGERY  1981    TUBAL LIGATION      "1 tube tied, 1 tube burned"     History since last visit: Rare pain near incisions.  Has abdominal cramping once monthly. Reports increased vasomotor symptoms.  Bladder issues: Denies UI. Nocturia 2-3 times a night. Limiting fluids prior to bedtime.   Bowel issues: Denies constipation or straining.    Complains of pain with intercourse with insertion.     Current " "Outpatient Prescriptions   Medication Sig    butalbital-acetaminophen-caffeine -40 mg (FIORICET, ESGIC) -40 mg per tablet Take 1 tablet by mouth every 4 (four) hours as needed for Pain.    cetirizine (ZYRTEC) 10 MG tablet Take 10 mg by mouth once daily.    citalopram (CELEXA) 20 MG tablet Take 1 tablet (20 mg total) by mouth once daily.    diclofenac (VOLTAREN) 75 MG EC tablet Take 1 tablet (75 mg total) by mouth 2 (two) times daily.    amitriptyline (ELAVIL) 10 MG tablet Take 10 mg x 1 week.  Increase by 10 mg weekly for total max dose of 50 mg.    estradiol (ESTRACE) 0.01 % (0.1 mg/gram) vaginal cream Use 1 gram of estrogen cream in vagina nightly x 2 weeks, then twice a week thereafter.     No current facility-administered medications for this visit.          ROS:  As per HPI.      Exam  /70 (BP Location: Right arm, Patient Position: Sitting, BP Method: Medium (Manual))   Ht 5' 3" (1.6 m)   Wt 88.1 kg (194 lb 3.6 oz)   LMP 02/08/2018 (Exact Date) Comment: still has ovaries.  BMI 34.41 kg/m²   General: alert and oriented, no acute distress  Respiratory: normal respiratory effort  Abd: soft, non-tender, non-distended  Incisons healing well    Pelvic  Ext. Genitalia: normal external genitalia. Normal bartholin's and skeens glands  Vagina: -- atrophy. Normal vaginal mucosa without lesions. No d/c.   No mesh visible/ palpable.  Sling path nontender.  No mesh palpable. Non-tender bladder base without palpable mass.  Cervix: absent  Uterus:  absent   Urethra: no masses or tenderness  Urethral meatus: no lesions, caruncle or prolapse.      Impression  1. Menopausal symptoms  amitriptyline (ELAVIL) 10 MG tablet   2. Vaginal atrophy  estradiol (ESTRACE) 0.01 % (0.1 mg/gram) vaginal cream     We reviewed the above issues and discussed options for short-term versus long-term management of her problems.   Plan:   1. Always get help lifting 50 lbs or more.   2. Menopausal symptoms--trial of " amitriptyline 10 mg nightly x 1 week.  Increase by 10 mg weekly for a total of 50 mg.  3. Vaginal atrophy (dryness):  Use 0.5 gram of estrogen cream in vagina nightly x 2 weeks, then twice a week thereafter.  4. Consider PT if pain with intercourse does not improved.  5. Moving to [place].  Please call for a year follow up for Janurary 2019.  The rest of your well woman exams can be followed by your gynecologist.        40 minutes were spent in face to face time with this patient  75 % of this time was spent in counseling and/or coordination of care     Vianney Espinosa NP  [facility]  Division of Female Pelvic Medicine and Reconstructive Surgery  Department of Obstetrics & Gynecology

## 2018-07-18 NOTE — PATIENT INSTRUCTIONS
1. Always get help lifting 50 lbs or more.   2. Menopausal symptoms--trial of amitriptyline 10 mg nightly x 1 week.  Increase by 10 mg weekly for a total of 50 mg.  3. Vaginal atrophy (dryness):  Use 0.5 gram of estrogen cream in vagina nightly x 2 weeks, then twice a week thereafter.  4. Consider PT if pain with intercourse does not improved.  5. Moving to [place].  Please call for a year follow up for Janurary 2019.  The rest of your well woman exams can be followed by your gynecologist.

## 2018-07-19 ENCOUNTER — PATIENT MESSAGE (OUTPATIENT)
Dept: INTERNAL MEDICINE | Facility: CLINIC | Age: 48
End: 2018-07-19

## 2018-07-20 ENCOUNTER — TELEPHONE (OUTPATIENT)
Dept: INTERNAL MEDICINE | Facility: CLINIC | Age: 48
End: 2018-07-20

## 2018-07-20 DIAGNOSIS — R74.8 ELEVATED LIVER ENZYMES: Primary | ICD-10-CM

## 2018-07-23 NOTE — TELEPHONE ENCOUNTER
----- Message from Carmine Martinez DO sent at 7/20/2018  1:35 PM CDT -----  Elevated liver enzymes(AST/ALT)- I will order an ultrasound of the liver and test for viral hepatitis   Normal blood counts and thyroid function

## 2018-07-24 ENCOUNTER — HOSPITAL ENCOUNTER (OUTPATIENT)
Dept: RADIOLOGY | Facility: HOSPITAL | Age: 48
Discharge: HOME | End: 2018-07-24
Attending: INTERNAL MEDICINE
Payer: COMMERCIAL

## 2018-07-24 DIAGNOSIS — R74.8 ELEVATED LIVER ENZYMES: ICD-10-CM

## 2018-07-24 PROBLEM — K76.0 NAFLD (NONALCOHOLIC FATTY LIVER DISEASE): Status: ACTIVE | Noted: 2018-07-24

## 2018-07-24 PROCEDURE — 76705 ECHO EXAM OF ABDOMEN: CPT | Mod: TC

## 2018-07-24 PROCEDURE — 76705 ECHO EXAM OF ABDOMEN: CPT | Mod: 26,,, | Performed by: RADIOLOGY

## 2018-09-04 ENCOUNTER — TELEPHONE (OUTPATIENT)
Dept: UROGYNECOLOGY | Facility: CLINIC | Age: 48
End: 2018-09-04

## 2018-09-04 NOTE — TELEPHONE ENCOUNTER
----- Message from Halima Santos sent at 9/4/2018  2:34 PM CDT -----            Name of Who is Calling:RYLAN BAE [[id number]]      What is the request in detail: Pt states she had a procedure back in January where her uterus was removed. She states she experienced bleeding(not a lot) on Saturday and she's concerned. Please call to discuss.      Can the clinic reply by [facility]: no      What Number to Call Back if not in [facility]:[phone]

## 2018-09-04 NOTE — TELEPHONE ENCOUNTER
Pt had a TLH on 1/8/18 and states she's having some vaginal bleeding after intercourse. Pt also denies using her vaginal moisturizer. Pt declined an appointment with BEN Espinosa or Dr. Bridges, she states she moved to [place] and does not have insurance. Pt was advised per BEN Espinosa to get scheduled with someone in [place] to get evaluated. Pt voiced understanding and call ended.

## 2019-01-25 ENCOUNTER — PATIENT MESSAGE (OUTPATIENT)
Dept: UROGYNECOLOGY | Facility: CLINIC | Age: 49
End: 2019-01-25

## 2019-01-25 ENCOUNTER — TELEPHONE (OUTPATIENT)
Dept: UROGYNECOLOGY | Facility: CLINIC | Age: 49
End: 2019-01-25

## 2019-01-25 NOTE — TELEPHONE ENCOUNTER
----- Message from Ruth Lerner sent at 1/25/2019 10:56 AM CST -----  Contact: Patient   Patient called to request a call back to discuss surgery that would be scheduled. The patient has currently moved out of state. The patient can be reached at [phone].

## 2019-01-25 NOTE — TELEPHONE ENCOUNTER
Spoke with pt whom had a 1 year post-op appointment coming up but recently moved to [place] and her new insurance will not cover a visit here, pt okay with that and requesting medical records to give to her new obgyn, pt was giving the number to release of information she voiced understanding and call was ended.

## 2019-10-10 ENCOUNTER — TELEPHONE (OUTPATIENT)
Dept: UROGYNECOLOGY | Facility: CLINIC | Age: 49
End: 2019-10-10

## 2019-10-10 ENCOUNTER — PATIENT MESSAGE (OUTPATIENT)
Dept: UROGYNECOLOGY | Facility: CLINIC | Age: 49
End: 2019-10-10

## 2019-10-10 NOTE — TELEPHONE ENCOUNTER
PAIGE,    I had a surgery last year on march 2018, I did a follow up with my OBGYN in [place] and He stated that everything was fine, but lately I have been feeling a lot of pain where my cuts are but inside specially when I put something like pants or when I lay down flat, also ever since the surgery my body it have been feeling weird, is that normal? I  know that it have been so long but still concern, unfortunately I cannot go see Vianney.

## 2019-10-10 NOTE — TELEPHONE ENCOUNTER
----- Message from Nadya Guzman sent at 10/10/2019  2:35 PM CDT -----  Contact: RYLAN BAE [[id number]]  Type:  Patient Returning Call    Who Called:     Who Left Message for Patient: Radha    Does the patient know what this is regarding?: missed call     Best Call Back Number:    Additional Information:  n/a

## 2019-10-10 NOTE — TELEPHONE ENCOUNTER
Informed pt she needed to schedule an appt with BEN Espinosa or an gyn closer to her. Pt stated she's been living in [place] over a year now and will schedule with a physician closer to her due to insurance. Informed pt I'll relay this message to BEN Espinosa, pt voiced understanding and call ended.

## 2019-10-11 DIAGNOSIS — Z12.39 BREAST CANCER SCREENING: ICD-10-CM

## 2020-10-05 ENCOUNTER — PATIENT MESSAGE (OUTPATIENT)
Dept: ADMINISTRATIVE | Facility: HOSPITAL | Age: 50
End: 2020-10-05

## 2021-01-04 ENCOUNTER — PATIENT MESSAGE (OUTPATIENT)
Dept: ADMINISTRATIVE | Facility: HOSPITAL | Age: 51
End: 2021-01-04

## 2021-04-05 ENCOUNTER — PATIENT MESSAGE (OUTPATIENT)
Dept: ADMINISTRATIVE | Facility: HOSPITAL | Age: 51
End: 2021-04-05

## 2021-07-06 ENCOUNTER — PATIENT MESSAGE (OUTPATIENT)
Dept: ADMINISTRATIVE | Facility: HOSPITAL | Age: 51
End: 2021-07-06

## (undated) DEVICE — TRAY FOLEY 16FR INFECTION CONT

## (undated) DEVICE — TROCAR ENDOPATH XCEL 11X100MM

## (undated) DEVICE — SOL IRR WATER STRL 3000 ML

## (undated) DEVICE — SEALER LIGASURE MARYLAND 37CM

## (undated) DEVICE — ADHESIVE DERMABOND ADVANCED

## (undated) DEVICE — SUT MONOCRYL 4-0 PS-2

## (undated) DEVICE — GAUZE VASELINE STERILE 3X9

## (undated) DEVICE — IRRIGATOR ENDOSCOPY DISP.

## (undated) DEVICE — SOL NS 1000CC

## (undated) DEVICE — COVER LIGHT HANDLE 80/CA

## (undated) DEVICE — SUT 0 VICRYL PLUS CT-1 27IN

## (undated) DEVICE — SEE MEDLINE ITEM 157181

## (undated) DEVICE — SEE MEDLINE ITEM 146292

## (undated) DEVICE — ADHESIVE MASTISOL VIAL 48/BX

## (undated) DEVICE — TROCAR ENDOPATH XCEL 11MM 10CM

## (undated) DEVICE — OCCLUDER COLPO-PNEUMO STERILE

## (undated) DEVICE — SYR 10CC LUER LOCK

## (undated) DEVICE — TRAY MINOR GEN SURG

## (undated) DEVICE — TUBING HF INSUFFLATION W/ FLTR

## (undated) DEVICE — SUT MCRYL PLUS 4-0 PS2 27IN

## (undated) DEVICE — SEE MEDLINE ITEM 152622

## (undated) DEVICE — CLIPPER BLADE MOD 4406 (CAREF)

## (undated) DEVICE — SCISSOR 5MMX35CM DIRECT DRIVE

## (undated) DEVICE — SUT 0 54IN COATED VICRYL U

## (undated) DEVICE — LUBRICANT SURGILUBE 2 OZ

## (undated) DEVICE — BLADE SURG CARBON STEEL SZ11

## (undated) DEVICE — SEE MEDLINE ITEM 154981

## (undated) DEVICE — GOWN SURGICAL X-LARGE

## (undated) DEVICE — Device

## (undated) DEVICE — SYR 50CC LL

## (undated) DEVICE — ELECTRODE REM PLYHSV RETURN 9

## (undated) DEVICE — DRESSING XEROFORM FOIL PK 1X8

## (undated) DEVICE — PACK LAPSCP/PELVSCPY III TIBRN

## (undated) DEVICE — TROCAR ENDOPATH XCEL 5X100MM

## (undated) DEVICE — SET IRR URLGY 2LINE UNIV SPIKE

## (undated) DEVICE — CLOSURE SKIN STERI STRIP 1/2X4

## (undated) DEVICE — SUT 2-0 VICRYL / SH (J417)

## (undated) DEVICE — NDL HYPO REG 25G X 1 1/2

## (undated) DEVICE — KIT ANTIFOG

## (undated) DEVICE — KIT PROCEDURE STER INLET CLOSU

## (undated) DEVICE — SEE MEDLINE ITEM 146313